# Patient Record
Sex: FEMALE | Race: WHITE | HISPANIC OR LATINO | Employment: PART TIME | URBAN - METROPOLITAN AREA
[De-identification: names, ages, dates, MRNs, and addresses within clinical notes are randomized per-mention and may not be internally consistent; named-entity substitution may affect disease eponyms.]

---

## 2017-12-12 LAB
EXTERNAL ABO GROUPING: NORMAL
EXTERNAL ANTIBODY SCREEN: NEGATIVE
EXTERNAL HEPATITIS B SURFACE ANTIGEN: NEGATIVE
EXTERNAL HEPATITIS C AB: NORMAL
EXTERNAL RH FACTOR: POSITIVE
EXTERNAL RUBELLA QUALITATIVE: NORMAL
EXTERNAL SYPHILIS RPR SCREEN: NORMAL
HIV1 P24 AG SERPL QL IA: NORMAL

## 2018-02-04 ENCOUNTER — HOSPITAL ENCOUNTER (OUTPATIENT)
Facility: HOSPITAL | Age: 33
Setting detail: OBSERVATION
Discharge: HOME | End: 2018-02-05
Attending: OBSTETRICS & GYNECOLOGY | Admitting: OBSTETRICS & GYNECOLOGY

## 2018-02-04 PROBLEM — Z34.90 PREGNANCY: Status: ACTIVE | Noted: 2018-02-04

## 2018-02-04 LAB
ALBUMIN SERPL-MCNC: 3.7 G/DL (ref 3.5–5.2)
ALBUMIN/GLOB SERPL: 1.1 G/DL
ALP SERPL-CCNC: 60 U/L (ref 39–117)
ALT SERPL W P-5'-P-CCNC: 22 U/L (ref 1–33)
ANION GAP SERPL CALCULATED.3IONS-SCNC: 13.9 MMOL/L
AST SERPL-CCNC: 17 U/L (ref 1–32)
BASOPHILS # BLD AUTO: 0.04 10*3/MM3 (ref 0–0.2)
BASOPHILS NFR BLD AUTO: 0.3 % (ref 0–1.5)
BILIRUB SERPL-MCNC: <0.2 MG/DL (ref 0.1–1.2)
BUN BLD-MCNC: 9 MG/DL (ref 6–20)
BUN/CREAT SERPL: 13 (ref 7–25)
CALCIUM SPEC-SCNC: 8.8 MG/DL (ref 8.6–10.5)
CHLORIDE SERPL-SCNC: 102 MMOL/L (ref 98–107)
CO2 SERPL-SCNC: 22.1 MMOL/L (ref 22–29)
CREAT BLD-MCNC: 0.69 MG/DL (ref 0.57–1)
DEPRECATED RDW RBC AUTO: 44.4 FL (ref 37–54)
EOSINOPHIL # BLD AUTO: 0.36 10*3/MM3 (ref 0–0.7)
EOSINOPHIL NFR BLD AUTO: 2.9 % (ref 0.3–6.2)
ERYTHROCYTE [DISTWIDTH] IN BLOOD BY AUTOMATED COUNT: 14.2 % (ref 11.7–13)
GFR SERPL CREATININE-BSD FRML MDRD: 118 ML/MIN/1.73
GFR SERPL CREATININE-BSD FRML MDRD: 97 ML/MIN/1.73
GLOBULIN UR ELPH-MCNC: 3.4 GM/DL
GLUCOSE BLD-MCNC: 99 MG/DL (ref 65–99)
HCT VFR BLD AUTO: 35.6 % (ref 35.6–45.5)
HGB BLD-MCNC: 11.9 G/DL (ref 11.9–15.5)
IMM GRANULOCYTES # BLD: 0.05 10*3/MM3 (ref 0–0.03)
IMM GRANULOCYTES NFR BLD: 0.4 % (ref 0–0.5)
LYMPHOCYTES # BLD AUTO: 2.99 10*3/MM3 (ref 0.9–4.8)
LYMPHOCYTES NFR BLD AUTO: 23.8 % (ref 19.6–45.3)
MCH RBC QN AUTO: 28.7 PG (ref 26.9–32)
MCHC RBC AUTO-ENTMCNC: 33.4 G/DL (ref 32.4–36.3)
MCV RBC AUTO: 85.8 FL (ref 80.5–98.2)
MONOCYTES # BLD AUTO: 0.93 10*3/MM3 (ref 0.2–1.2)
MONOCYTES NFR BLD AUTO: 7.4 % (ref 5–12)
NEUTROPHILS # BLD AUTO: 8.2 10*3/MM3 (ref 1.9–8.1)
NEUTROPHILS NFR BLD AUTO: 65.2 % (ref 42.7–76)
PLATELET # BLD AUTO: 285 10*3/MM3 (ref 140–500)
PMV BLD AUTO: 11.3 FL (ref 6–12)
POTASSIUM BLD-SCNC: 3.7 MMOL/L (ref 3.5–5.2)
PROT SERPL-MCNC: 7.1 G/DL (ref 6–8.5)
RBC # BLD AUTO: 4.15 10*6/MM3 (ref 3.9–5.2)
SODIUM BLD-SCNC: 138 MMOL/L (ref 136–145)
WBC NRBC COR # BLD: 12.57 10*3/MM3 (ref 4.5–10.7)

## 2018-02-04 PROCEDURE — G0378 HOSPITAL OBSERVATION PER HR: HCPCS

## 2018-02-04 PROCEDURE — 85025 COMPLETE CBC W/AUTO DIFF WBC: CPT | Performed by: OBSTETRICS & GYNECOLOGY

## 2018-02-04 PROCEDURE — 80053 COMPREHEN METABOLIC PANEL: CPT | Performed by: OBSTETRICS & GYNECOLOGY

## 2018-02-04 RX ORDER — NIFEDIPINE 20 MG/1
20 CAPSULE ORAL ONCE
Status: COMPLETED | OUTPATIENT
Start: 2018-02-04 | End: 2018-02-04

## 2018-02-04 RX ORDER — ACETAMINOPHEN 500 MG
1000 TABLET ORAL ONCE
Status: COMPLETED | OUTPATIENT
Start: 2018-02-04 | End: 2018-02-04

## 2018-02-04 RX ORDER — ONDANSETRON 4 MG/1
4 TABLET, FILM COATED ORAL EVERY 8 HOURS PRN
Status: ON HOLD | COMMUNITY
End: 2020-02-24

## 2018-02-04 RX ORDER — PRENATAL VIT NO.126/IRON/FOLIC 28MG-0.8MG
1 TABLET ORAL DAILY
COMMUNITY

## 2018-02-04 RX ADMIN — NIFEDIPINE 20 MG: 20 CAPSULE, LIQUID FILLED ORAL at 20:40

## 2018-02-04 RX ADMIN — NIFEDIPINE 20 MG: 20 CAPSULE, LIQUID FILLED ORAL at 20:15

## 2018-02-04 RX ADMIN — ACETAMINOPHEN 1000 MG: 500 TABLET ORAL at 20:15

## 2018-02-04 RX ADMIN — NIFEDIPINE 20 MG: 20 CAPSULE, LIQUID FILLED ORAL at 21:34

## 2018-02-04 RX ADMIN — NIFEDIPINE 20 MG: 20 CAPSULE, LIQUID FILLED ORAL at 22:15

## 2018-02-05 VITALS
OXYGEN SATURATION: 98 % | BODY MASS INDEX: 35.3 KG/M2 | DIASTOLIC BLOOD PRESSURE: 87 MMHG | WEIGHT: 187 LBS | SYSTOLIC BLOOD PRESSURE: 149 MMHG | TEMPERATURE: 98.6 F | RESPIRATION RATE: 18 BRPM | HEIGHT: 61 IN | HEART RATE: 87 BPM

## 2018-02-05 PROBLEM — O16.2 HYPERTENSION AFFECTING PREGNANCY IN SECOND TRIMESTER: Status: ACTIVE | Noted: 2018-02-05

## 2018-02-05 PROCEDURE — G0378 HOSPITAL OBSERVATION PER HR: HCPCS

## 2018-02-05 RX ORDER — NIFEDIPINE 20 MG/1
20 CAPSULE ORAL
Status: DISCONTINUED | OUTPATIENT
Start: 2018-02-05 | End: 2018-02-05 | Stop reason: HOSPADM

## 2018-02-05 RX ORDER — NIFEDIPINE 60 MG/1
60 TABLET, FILM COATED, EXTENDED RELEASE ORAL
Qty: 30 TABLET | Refills: 1 | Status: ON HOLD | OUTPATIENT
Start: 2018-02-05 | End: 2020-02-24

## 2018-02-05 RX ORDER — ZOLPIDEM TARTRATE 5 MG/1
5 TABLET ORAL NIGHTLY PRN
Status: DISCONTINUED | OUTPATIENT
Start: 2018-02-05 | End: 2018-02-05 | Stop reason: HOSPADM

## 2018-02-05 RX ORDER — SENNA AND DOCUSATE SODIUM 50; 8.6 MG/1; MG/1
1 TABLET, FILM COATED ORAL 2 TIMES DAILY PRN
Status: DISCONTINUED | OUTPATIENT
Start: 2018-02-05 | End: 2018-02-05 | Stop reason: HOSPADM

## 2018-02-05 RX ORDER — FAMOTIDINE 20 MG/1
20 TABLET, FILM COATED ORAL 2 TIMES DAILY PRN
Status: DISCONTINUED | OUTPATIENT
Start: 2018-02-05 | End: 2018-02-05 | Stop reason: HOSPADM

## 2018-02-05 RX ORDER — CALCIUM CARBONATE 200(500)MG
2 TABLET,CHEWABLE ORAL DAILY PRN
Status: DISCONTINUED | OUTPATIENT
Start: 2018-02-05 | End: 2018-02-05 | Stop reason: HOSPADM

## 2018-02-05 RX ORDER — NIFEDIPINE 60 MG/1
60 TABLET, EXTENDED RELEASE ORAL
Status: DISCONTINUED | OUTPATIENT
Start: 2018-02-05 | End: 2018-02-05 | Stop reason: HOSPADM

## 2018-02-05 RX ORDER — SODIUM CHLORIDE 0.9 % (FLUSH) 0.9 %
1-10 SYRINGE (ML) INJECTION AS NEEDED
Status: DISCONTINUED | OUTPATIENT
Start: 2018-02-05 | End: 2018-02-05 | Stop reason: HOSPADM

## 2018-02-05 RX ORDER — ACETAMINOPHEN 325 MG/1
975 TABLET ORAL EVERY 4 HOURS PRN
Status: DISCONTINUED | OUTPATIENT
Start: 2018-02-05 | End: 2018-02-05 | Stop reason: HOSPADM

## 2018-02-05 RX ORDER — DIPHENHYDRAMINE HCL 25 MG
25 CAPSULE ORAL EVERY 4 HOURS PRN
Status: DISCONTINUED | OUTPATIENT
Start: 2018-02-05 | End: 2018-02-05 | Stop reason: HOSPADM

## 2018-02-05 RX ADMIN — ACETAMINOPHEN 975 MG: 325 TABLET ORAL at 06:37

## 2018-02-05 RX ADMIN — NIFEDIPINE 60 MG: 60 TABLET, FILM COATED, EXTENDED RELEASE ORAL at 08:50

## 2018-02-05 NOTE — DISCHARGE SUMMARY
Date of Discharge:  2/5/2018    Discharge Diagnosis: 15 weeks hypertension    Presenting Problem/History of Present Illness  Pregnancy [Z34.90]  Hypertension affecting pregnancy in second trimester [O16.2]       Hospital Course  Patient is a 32 y.o. female presented from [facility] after going there for eval with abdominal pain.  FHR was fine.  No bleeding.  But BP was noted to be elevated so pt was transferred here for tx.  Responded well to Procardia.     Procedures Performed  Bedrest, antihypertnesive therapy       Consults:   Consults     No orders found for last 30 day(s).          Pertinent Test Results: none    Condition on Discharge:  Feels well.  Reports good FM.  No vb.  Ready for discharge    Vital Signs  Temp:  [98.3 °F (36.8 °C)] 98.3 °F (36.8 °C)  Heart Rate:  [] 89  Resp:  [16-18] 18  BP: (130-213)/() 140/83    Physical Exam:  General: Awake and alert  Abdomen: Gravid, soft,  non tender  Extremities:  Calves NT bilaterally        Discharge Disposition  Home or Self Careexcellent    Discharge Medications   Koby Walden   [place] Medication Instructions CRISTINA:[id number]    Printed on:02/05/18 8506   Medication Information                      NIFEdipine XL (ADALAT CC) 60 MG 24 hr tablet  Take 1 tablet by mouth Daily.             ondansetron (ZOFRAN) 4 MG tablet  Take 4 mg by mouth Every 8 (Eight) Hours As Needed for Nausea or Vomiting.             Prenatal Vit-Fe Fumarate-FA (PRENATAL, CLASSIC, VITAMIN) 28-0.8 MG tablet tablet  Take 1 tablet by mouth Daily.                 Discharge Diet:   regular  Activity at Discharge:   Ad luana  Follow-up Appointments  2/8    Test Results Pending at Discharge  none     Wendie Palomo MD  02/05/18  8:25 AM

## 2018-02-05 NOTE — H&P
.[facility]  Obstetric History and Physical    Chief Complaint   Patient presents with   • Abdominal Pain     Pt went to [facility] ED for abd pain. Sent here due to elevated BP. Pt reports has had the stomach bug this week with last emesis at 1400. C/O headache also       Subjective     Patient is a 32 y.o. female  currently at 14w6d, who presents with inc bp when seen at [facility] ER. bp 210/110. Office chart says has essential htn but pt said no problems since end of last preg. No bp meds.          Past OB History:       Obstetric History       T1      L1     SAB0   TAB0   Ectopic0   Multiple0   Live Births1       # Outcome Date GA Lbr Alfredo/2nd Weight Sex Delivery Anes PTL Lv   2 Current            1 Term 04    F Vag-Spont  N PEE      Complications: Hypertension affecting pregnancy in third trimester          Past Medical History: Past Medical History:   Diagnosis Date   • Gestational hypertension    • Ovarian cyst     Right side      Past Surgical History History reviewed. No pertinent surgical history.   Family History: History reviewed. No pertinent family history.   Social History:  reports that she quit smoking about 10 years ago. She has never used smokeless tobacco.   reports that she does not drink alcohol.   reports that she does not use illicit drugs.    Allergies:     Review of patient's allergies indicates no known allergies.       Objective       Vital Signs Range for the last 24 hours  Temperature: Temp:  [98.3 °F (36.8 °C)] 98.3 °F (36.8 °C)   Temp Source: Temp src: Oral   BP: BP: (141-213)/() 141/90   Pulse: Heart Rate:  [] 118   Respirations: Resp:  [16] 16                   Physical Examination:                                 Fetal Heart Rate Assessment   Method: Fetal HR Assessment Method: intermittent auscultation, using Doppler   Beats/min: Fetal HR (Beats/Min): 166   Baseline:     Varibility:     Accels:     Decels:     Tracing Category:          Results from last 7 days  Lab Units 02/04/18 2018   WBC 10*3/mm3 12.57*   HEMOGLOBIN g/dL 11.9   HEMATOCRIT % 35.6   PLATELETS 10*3/mm3 285       Results from last 7 days  Lab Units 02/04/18 2018   SODIUM mmol/L 138   POTASSIUM mmol/L 3.7   CHLORIDE mmol/L 102   CO2 mmol/L 22.1   BUN mg/dL 9   CREATININE mg/dL 0.69   CALCIUM mg/dL 8.8   BILIRUBIN mg/dL <0.2   ALK PHOS U/L 60   ALT (SGPT) U/L 22   AST (SGOT) U/L 17   GLUCOSE mg/dL 99                   Assessment/Plan       Assessment:  1.  Intrauterine pregnancy at 14w6d weeks gestation with reassuring fetal status.    2.  Severe HTN - needed 80mg procardia po to bring from 200/110 range to 160/90    Plan:   admit to antepartum overnight to start antihypertensive meds and get to 150/90 range.      Office prenatal reviewed        Jolene Campbell MD  2/4/2018  11:02 PM

## 2018-02-05 NOTE — PLAN OF CARE
Problem: Patient Care Overview (Adult)  Goal: Plan of Care Review  Outcome: Ongoing (interventions implemented as appropriate)   02/05/18 0640   Coping/Psychosocial Response Interventions   Plan Of Care Reviewed With patient   Patient Care Overview   Progress no change   Outcome Evaluation   Outcome Summary/Follow up Plan Labile BPs. Tylenol given for HA. Pt to start Procardia 60 mg XL today.       Problem: Prenatal Inpatient (Adult,Obstetrics,Pediatric)  Goal: Signs and Symptoms of Listed Potential Problems Will be Absent or Manageable (Prenatal Inpatient)  Outcome: Ongoing (interventions implemented as appropriate)   02/05/18 0640   Prenatal Inpatient   Problems Assessed (Prenatal Patient) all   Problems Present (Prenatal Patient) none       Problem: Hypertensive Disorders in Pregnancy (Adult,Obstetrics,Pediatric)  Goal: Signs and Symptoms of Listed Potential Problems Will be Absent or Manageable (Hypertensive Disorders in Pregnancy)  Outcome: Ongoing (interventions implemented as appropriate)   02/05/18 0640   Hypertensive Disorders in Pregnancy   Problems Assessed (Hypertensive Disorders in Pregnancy) all   Problems Present (Hypertensive Disorders in Pregnancy) none

## 2018-02-05 NOTE — NURSING NOTE
Discharge instructions reviewed with pt and sig other.  AVS reviewed in its entirety.  No questions at this time.  Pt declined transport and ambulated off the unit with sig other

## 2018-02-08 ENCOUNTER — TELEPHONE (OUTPATIENT)
Dept: LABOR AND DELIVERY | Facility: HOSPITAL | Age: 33
End: 2018-02-08

## 2018-04-09 ENCOUNTER — HOSPITAL ENCOUNTER (OUTPATIENT)
Facility: HOSPITAL | Age: 33
Setting detail: OBSERVATION
Discharge: ANOTHER HOSPITAL | End: 2018-04-09
Attending: OBSTETRICS & GYNECOLOGY | Admitting: OBSTETRICS & GYNECOLOGY

## 2018-04-09 VITALS
HEIGHT: 61 IN | TEMPERATURE: 98.2 F | HEART RATE: 80 BPM | RESPIRATION RATE: 16 BRPM | WEIGHT: 194 LBS | OXYGEN SATURATION: 100 % | DIASTOLIC BLOOD PRESSURE: 95 MMHG | SYSTOLIC BLOOD PRESSURE: 168 MMHG | BODY MASS INDEX: 36.63 KG/M2

## 2018-04-09 LAB
ALBUMIN SERPL-MCNC: 3.1 G/DL (ref 3.5–5.2)
ALBUMIN/GLOB SERPL: 0.9 G/DL
ALP SERPL-CCNC: 82 U/L (ref 39–117)
ALT SERPL W P-5'-P-CCNC: 37 U/L (ref 1–33)
ANION GAP SERPL CALCULATED.3IONS-SCNC: 13.4 MMOL/L
AST SERPL-CCNC: 30 U/L (ref 1–32)
BASOPHILS # BLD AUTO: 0.03 10*3/MM3 (ref 0–0.2)
BASOPHILS NFR BLD AUTO: 0.2 % (ref 0–1.5)
BILIRUB SERPL-MCNC: 0.3 MG/DL (ref 0.1–1.2)
BUN BLD-MCNC: 11 MG/DL (ref 6–20)
BUN/CREAT SERPL: 15.3 (ref 7–25)
CALCIUM SPEC-SCNC: 8.9 MG/DL (ref 8.6–10.5)
CHLORIDE SERPL-SCNC: 100 MMOL/L (ref 98–107)
CO2 SERPL-SCNC: 22.6 MMOL/L (ref 22–29)
CREAT BLD-MCNC: 0.72 MG/DL (ref 0.57–1)
DEPRECATED RDW RBC AUTO: 47.8 FL (ref 37–54)
EOSINOPHIL # BLD AUTO: 0.11 10*3/MM3 (ref 0–0.7)
EOSINOPHIL NFR BLD AUTO: 0.9 % (ref 0.3–6.2)
ERYTHROCYTE [DISTWIDTH] IN BLOOD BY AUTOMATED COUNT: 14.9 % (ref 11.7–13)
EXPIRATION DATE: ABNORMAL
GFR SERPL CREATININE-BSD FRML MDRD: 92 ML/MIN/1.73
GLOBULIN UR ELPH-MCNC: 3.3 GM/DL
GLUCOSE BLD-MCNC: 85 MG/DL (ref 65–99)
GLUCOSE BLDC GLUCOMTR-MCNC: 86 MG/DL (ref 70–130)
HCT VFR BLD AUTO: 33.7 % (ref 35.6–45.5)
HGB BLD-MCNC: 11.2 G/DL (ref 11.9–15.5)
IMM GRANULOCYTES # BLD: 0.09 10*3/MM3 (ref 0–0.03)
IMM GRANULOCYTES NFR BLD: 0.7 % (ref 0–0.5)
LYMPHOCYTES # BLD AUTO: 2.77 10*3/MM3 (ref 0.9–4.8)
LYMPHOCYTES NFR BLD AUTO: 21.9 % (ref 19.6–45.3)
Lab: ABNORMAL
MCH RBC QN AUTO: 29.2 PG (ref 26.9–32)
MCHC RBC AUTO-ENTMCNC: 33.2 G/DL (ref 32.4–36.3)
MCV RBC AUTO: 87.8 FL (ref 80.5–98.2)
MONOCYTES # BLD AUTO: 0.85 10*3/MM3 (ref 0.2–1.2)
MONOCYTES NFR BLD AUTO: 6.7 % (ref 5–12)
NEUTROPHILS # BLD AUTO: 8.79 10*3/MM3 (ref 1.9–8.1)
NEUTROPHILS NFR BLD AUTO: 69.6 % (ref 42.7–76)
NRBC BLD MANUAL-RTO: 0 /100 WBC (ref 0–0)
PLATELET # BLD AUTO: 148 10*3/MM3 (ref 140–500)
PMV BLD AUTO: 11.1 FL (ref 6–12)
POTASSIUM BLD-SCNC: 4 MMOL/L (ref 3.5–5.2)
PROT SERPL-MCNC: 6.4 G/DL (ref 6–8.5)
PROT UR STRIP-MCNC: ABNORMAL MG/DL
RBC # BLD AUTO: 3.84 10*6/MM3 (ref 3.9–5.2)
SODIUM BLD-SCNC: 136 MMOL/L (ref 136–145)
WBC NRBC COR # BLD: 12.64 10*3/MM3 (ref 4.5–10.7)

## 2018-04-09 PROCEDURE — G0378 HOSPITAL OBSERVATION PER HR: HCPCS

## 2018-04-09 PROCEDURE — 85027 COMPLETE CBC AUTOMATED: CPT | Performed by: OBSTETRICS & GYNECOLOGY

## 2018-04-09 PROCEDURE — 81002 URINALYSIS NONAUTO W/O SCOPE: CPT | Performed by: OBSTETRICS & GYNECOLOGY

## 2018-04-09 PROCEDURE — 96374 THER/PROPH/DIAG INJ IV PUSH: CPT

## 2018-04-09 PROCEDURE — 96361 HYDRATE IV INFUSION ADD-ON: CPT

## 2018-04-09 PROCEDURE — 25010000002 PROMETHAZINE PER 50 MG: Performed by: OBSTETRICS & GYNECOLOGY

## 2018-04-09 PROCEDURE — 25010000002 BETAMETHASONE ACET & SOD PHOS PER 4 MG: Performed by: OBSTETRICS & GYNECOLOGY

## 2018-04-09 PROCEDURE — 25010000002 HYDRALAZINE PER 20 MG: Performed by: OBSTETRICS & GYNECOLOGY

## 2018-04-09 PROCEDURE — 82962 GLUCOSE BLOOD TEST: CPT

## 2018-04-09 PROCEDURE — 80053 COMPREHEN METABOLIC PANEL: CPT | Performed by: OBSTETRICS & GYNECOLOGY

## 2018-04-09 PROCEDURE — 96372 THER/PROPH/DIAG INJ SC/IM: CPT

## 2018-04-09 PROCEDURE — 96376 TX/PRO/DX INJ SAME DRUG ADON: CPT

## 2018-04-09 PROCEDURE — 96375 TX/PRO/DX INJ NEW DRUG ADDON: CPT

## 2018-04-09 RX ORDER — LABETALOL 200 MG/1
300 TABLET, FILM COATED ORAL EVERY 12 HOURS SCHEDULED
Status: DISCONTINUED | OUTPATIENT
Start: 2018-04-09 | End: 2018-04-09 | Stop reason: HOSPADM

## 2018-04-09 RX ORDER — METHYLDOPA 500 MG/1
1000 TABLET, FILM COATED ORAL EVERY 8 HOURS SCHEDULED
Status: DISCONTINUED | OUTPATIENT
Start: 2018-04-09 | End: 2018-04-09 | Stop reason: HOSPADM

## 2018-04-09 RX ORDER — PROMETHAZINE HYDROCHLORIDE 25 MG/ML
12.5 INJECTION, SOLUTION INTRAMUSCULAR; INTRAVENOUS ONCE
Status: COMPLETED | OUTPATIENT
Start: 2018-04-09 | End: 2018-04-09

## 2018-04-09 RX ORDER — MAGNESIUM SULFATE HEPTAHYDRATE 40 MG/ML
4 INJECTION, SOLUTION INTRAVENOUS ONCE
Status: DISCONTINUED | OUTPATIENT
Start: 2018-04-09 | End: 2018-04-09

## 2018-04-09 RX ORDER — MAGNESIUM SULFATE HEPTAHYDRATE 40 MG/ML
2 INJECTION, SOLUTION INTRAVENOUS CONTINUOUS
Status: DISCONTINUED | OUTPATIENT
Start: 2018-04-09 | End: 2018-04-09

## 2018-04-09 RX ORDER — MAGNESIUM SULFATE HEPTAHYDRATE 40 MG/ML
2 INJECTION, SOLUTION INTRAVENOUS CONTINUOUS
Status: DISCONTINUED | OUTPATIENT
Start: 2018-04-09 | End: 2018-04-09 | Stop reason: HOSPADM

## 2018-04-09 RX ORDER — METHYLDOPA 250 MG/1
250 TABLET, FILM COATED ORAL 3 TIMES DAILY
COMMUNITY
End: 2020-02-27 | Stop reason: HOSPADM

## 2018-04-09 RX ORDER — SODIUM CHLORIDE, SODIUM LACTATE, POTASSIUM CHLORIDE, CALCIUM CHLORIDE 600; 310; 30; 20 MG/100ML; MG/100ML; MG/100ML; MG/100ML
125 INJECTION, SOLUTION INTRAVENOUS CONTINUOUS
Status: DISCONTINUED | OUTPATIENT
Start: 2018-04-09 | End: 2018-04-09 | Stop reason: HOSPADM

## 2018-04-09 RX ORDER — HYDRALAZINE HYDROCHLORIDE 20 MG/ML
20 INJECTION INTRAMUSCULAR; INTRAVENOUS EVERY 6 HOURS PRN
Status: DISCONTINUED | OUTPATIENT
Start: 2018-04-09 | End: 2018-04-09 | Stop reason: HOSPADM

## 2018-04-09 RX ORDER — ACETAMINOPHEN 500 MG
500 TABLET ORAL EVERY 6 HOURS PRN
Status: ON HOLD | COMMUNITY
End: 2020-02-24

## 2018-04-09 RX ORDER — LABETALOL HYDROCHLORIDE 5 MG/ML
20 INJECTION, SOLUTION INTRAVENOUS ONCE
Status: COMPLETED | OUTPATIENT
Start: 2018-04-09 | End: 2018-04-09

## 2018-04-09 RX ORDER — BETAMETHASONE SODIUM PHOSPHATE AND BETAMETHASONE ACETATE 3; 3 MG/ML; MG/ML
12 INJECTION, SUSPENSION INTRA-ARTICULAR; INTRALESIONAL; INTRAMUSCULAR; SOFT TISSUE EVERY 24 HOURS
Status: DISCONTINUED | OUTPATIENT
Start: 2018-04-09 | End: 2018-04-09 | Stop reason: HOSPADM

## 2018-04-09 RX ADMIN — SODIUM CHLORIDE, POTASSIUM CHLORIDE, SODIUM LACTATE AND CALCIUM CHLORIDE 125 ML/HR: 600; 310; 30; 20 INJECTION, SOLUTION INTRAVENOUS at 11:47

## 2018-04-09 RX ADMIN — MEPERIDINE HYDROCHLORIDE 25 MG: 25 INJECTION, SOLUTION INTRAMUSCULAR; INTRAVENOUS; SUBCUTANEOUS at 15:58

## 2018-04-09 RX ADMIN — BETAMETHASONE SODIUM PHOSPHATE AND BETAMETHASONE ACETATE 12 MG: 3; 3 INJECTION, SUSPENSION INTRA-ARTICULAR; INTRALESIONAL; INTRAMUSCULAR at 15:31

## 2018-04-09 RX ADMIN — LABETALOL HYDROCHLORIDE 20 MG: 5 INJECTION INTRAVENOUS at 12:36

## 2018-04-09 RX ADMIN — HYDRALAZINE HYDROCHLORIDE 20 MG: 20 INJECTION INTRAMUSCULAR; INTRAVENOUS at 14:43

## 2018-04-09 RX ADMIN — PROMETHAZINE HYDROCHLORIDE 12.5 MG: 25 INJECTION INTRAMUSCULAR; INTRAVENOUS at 16:01

## 2018-04-09 RX ADMIN — METHYLDOPA 1000 MG: 500 TABLET ORAL at 15:43

## 2018-04-09 RX ADMIN — LABETALOL HCL 300 MG: 200 TABLET, FILM COATED ORAL at 15:36

## 2018-04-09 RX ADMIN — MAGNESIUM SULFATE HEPTAHYDRATE 2 G/HR: 40 INJECTION, SOLUTION INTRAVENOUS at 15:31

## 2018-04-09 NOTE — PLAN OF CARE
Problem: Patient Care Overview  Goal: Plan of Care Review  Outcome: Unable to achieve outcome(s) by discharge Date Met: 18   Coping/Psychosocial   Plan of Care Reviewed With patient;family;significant other   Plan of Care Review   Progress no change   OTHER   Outcome Summary Patient transferred to [facility] for further care     Goal: Individualization and Mutuality  Outcome: Unable to achieve outcome(s) by discharge Date Met: 18   Individualization   Patient Specific Preferences lower blood pressures   Patient Specific Goals (Include Timeframe) maintain pregnacy to term   Patient Specific Interventions blood pressure medication, position changes and oxygen   Mutuality/Individual Preferences   What Anxieties, Fears, Concerns, or Questions Do You Have About Your Care?  delivery   How Would You and/or Your Support Person Like to Participate in Your Care? FOB called to bedside by patient before transport     Goal: Interprofessional Rounds/Family Conf  Outcome: Unable to achieve outcome(s) by discharge Date Met: 18   Interdisciplinary Rounds/Family Conf   Participants family;nursing;patient;physician       Problem: Hypertensive Disorders in Pregnancy (Adult,Obstetrics,Pediatric)  Goal: Signs and Symptoms of Listed Potential Problems Will be Absent, Minimized or Managed (Hypertensive Disorders in Pregnancy)  Outcome: Unable to achieve outcome(s) by discharge Date Met: 18   Goal/Outcome Evaluation   Problems Assessed (Hypertensive Disorders in Pregnancy) all   Problems Present (Hypertensive/in PG) (CHTN)

## 2018-04-09 NOTE — H&P
[facility]  Obstetric History and Physical    Patient Name: Koby Walden  :  1985  MRN:  [id number]        Chief Complaint   Patient presents with   • pregnancy     c/o elevated BP all weekend w/frontal h/a pain that has prevented sleep. States hx chronic HTN but never this high. Frontal stabbing headache.       Subjective     Patient is a 33 y.o. female  currently at 24w0d, who presents from the office complaining of elevated bp since Saturday.  On Saturday she noticed a severe headache and has had 2 episodes over the weekend of n/v. She does take her BP at home and noticed it was 160-190/100.  She has had elevated BPs for 2months but no history of CHTN predating pregnancy. She was being managed with 30mg xl Procardia and Aldomet 500 in the am and 250 in the pm. She denies edema. She states her headache is much better now.  It is mild since she took Tylenol. She has a 15yo who delivered at term and was uncomplicated. She is 33 but otherwise healthy.      Past Medical History: Past Medical History:   Diagnosis Date   • Gestational hypertension    • Ovarian cyst     Right side      Past Surgical History History reviewed. No pertinent surgical history.   Family History: History reviewed. No pertinent family history.   Social History:  reports that she quit smoking about 10 years ago. She has never used smokeless tobacco.   reports that she does not drink alcohol.   reports that she does not use drugs.    Allergies:     Review of patient's allergies indicates no known allergies.     Past OB History:       Obstetric History       T1      L1     SAB0   TAB0   Ectopic0   Molar0   Multiple0   Live Births1       # Outcome Date GA Lbr Alfredo/2nd Weight Sex Delivery Anes PTL Lv   2 Current            1 Term 04    F Vag-Spont  N PEE      Complications: Hypertension affecting pregnancy in third trimester            Objective       Vital Signs Range for the last 24  hours  Temperature: Temp:  [98 °F (36.7 °C)] 98 °F (36.7 °C)   Temp Source: Temp src: Oral   BP: BP: (198-238)/() 210/96   Pulse: Heart Rate:  [60-83] 69   Respirations: Resp:  [17] 17             Physical Exam:        General :    Alert and cooperative in NAD   Lungs:   Clear to auscultation bilaterally   CV:   Regular rate and rhythm   Abdomen:  Gravid, non-tender, no masses   Vaginal exam:      LE:   no edema and 1pl DTRs     FHR:   150s,    [place]:   No ctx              A/P:    23 and 6wk  with CHTN and my concern for SI pre-clampsia given 3plus protein in the office today.  On admission her Bp's are 220/110s which did not respond to 20mg IV of labetolol. She did respond to 20mg of hydralazine and now her BP is 164/90.  I am starting her on labetolol 300mg bid and aldomet 1000mg tid.  We have a UA in process and are starting a 24hr urine.  CBC/CMP p.  I talked with dr stokes who recommended transfer to  as he is concerned she will not make it to 28wks.  He recommends doing an ultrasound there.  I am starting MgSO4 and BMZ.       Problem List Items Addressed This Visit     None      Visit Diagnoses    None.                 Nelida Magdaleno MD  2018  3:15 PM

## 2020-01-30 LAB
EXTERNAL ANTIBODY SCREEN: NEGATIVE
EXTERNAL HEPATITIS B SURFACE ANTIGEN: NEGATIVE
EXTERNAL HEPATITIS B SURFACE ANTIGEN: NEGATIVE
EXTERNAL HEPATITIS C AB: NEGATIVE
EXTERNAL HEPATITIS C AB: NORMAL
EXTERNAL RUBELLA QUALITATIVE: NORMAL
EXTERNAL RUBELLA QUALITATIVE: NORMAL
EXTERNAL SYPHILIS RPR SCREEN: NORMAL
EXTERNAL SYPHILIS RPR SCREEN: NORMAL
HIV1 P24 AG SERPL QL IA: NEGATIVE
HIV1 P24 AG SERPL QL IA: NORMAL

## 2020-02-06 ENCOUNTER — TRANSCRIBE ORDERS (OUTPATIENT)
Dept: OBSTETRICS AND GYNECOLOGY | Facility: CLINIC | Age: 35
End: 2020-02-06

## 2020-02-06 DIAGNOSIS — O16.2 HYPERTENSION AFFECTING PREGNANCY IN SECOND TRIMESTER: Primary | ICD-10-CM

## 2020-02-06 DIAGNOSIS — Z87.59 HISTORY OF IUFD: ICD-10-CM

## 2020-02-24 ENCOUNTER — HOSPITAL ENCOUNTER (INPATIENT)
Facility: HOSPITAL | Age: 35
LOS: 3 days | Discharge: HOME | End: 2020-02-27
Attending: OBSTETRICS & GYNECOLOGY | Admitting: OBSTETRICS & GYNECOLOGY

## 2020-02-24 ENCOUNTER — HOSPITAL ENCOUNTER (OUTPATIENT)
Dept: ULTRASOUND IMAGING | Facility: HOSPITAL | Age: 35
Discharge: HOME | End: 2020-02-24
Admitting: OBSTETRICS & GYNECOLOGY

## 2020-02-24 ENCOUNTER — OFFICE VISIT (OUTPATIENT)
Dept: OBSTETRICS AND GYNECOLOGY | Facility: CLINIC | Age: 35
End: 2020-02-24

## 2020-02-24 ENCOUNTER — TRANSCRIBE ORDERS (OUTPATIENT)
Dept: ULTRASOUND IMAGING | Facility: HOSPITAL | Age: 35
End: 2020-02-24

## 2020-02-24 VITALS
BODY MASS INDEX: 36.63 KG/M2 | WEIGHT: 194 LBS | HEIGHT: 61 IN | DIASTOLIC BLOOD PRESSURE: 100 MMHG | HEART RATE: 102 BPM | SYSTOLIC BLOOD PRESSURE: 188 MMHG

## 2020-02-24 DIAGNOSIS — O16.2 HYPERTENSION AFFECTING PREGNANCY IN SECOND TRIMESTER: Primary | ICD-10-CM

## 2020-02-24 DIAGNOSIS — O16.2 HYPERTENSION AFFECTING PREGNANCY IN SECOND TRIMESTER: ICD-10-CM

## 2020-02-24 DIAGNOSIS — O09.522 AMA (ADVANCED MATERNAL AGE) MULTIGRAVIDA 35+, SECOND TRIMESTER: ICD-10-CM

## 2020-02-24 DIAGNOSIS — Z87.59 HISTORY OF IUFD: ICD-10-CM

## 2020-02-24 PROBLEM — O16.9 HYPERTENSION AFFECTING PREGNANCY: Status: ACTIVE | Noted: 2020-02-24

## 2020-02-24 PROBLEM — O16.1: Status: ACTIVE | Noted: 2020-02-24

## 2020-02-24 LAB
ABO GROUP BLD: NORMAL
ALBUMIN SERPL-MCNC: 3.7 G/DL (ref 3.5–5.2)
ALBUMIN/GLOB SERPL: 1.2 G/DL
ALP SERPL-CCNC: 53 U/L (ref 39–117)
ALT SERPL W P-5'-P-CCNC: 32 U/L (ref 1–33)
ANION GAP SERPL CALCULATED.3IONS-SCNC: 13.2 MMOL/L (ref 5–15)
AST SERPL-CCNC: 31 U/L (ref 1–32)
BASOPHILS # BLD AUTO: 0.09 10*3/MM3 (ref 0–0.2)
BASOPHILS NFR BLD AUTO: 0.7 % (ref 0–1.5)
BILIRUB SERPL-MCNC: 0.2 MG/DL (ref 0.2–1.2)
BLD GP AB SCN SERPL QL: NEGATIVE
BUN BLD-MCNC: 7 MG/DL (ref 6–20)
BUN/CREAT SERPL: 10.4 (ref 7–25)
CALCIUM SPEC-SCNC: 8.7 MG/DL (ref 8.6–10.5)
CHLORIDE SERPL-SCNC: 103 MMOL/L (ref 98–107)
CO2 SERPL-SCNC: 19.8 MMOL/L (ref 22–29)
CREAT BLD-MCNC: 0.67 MG/DL (ref 0.57–1)
DEPRECATED RDW RBC AUTO: 44.7 FL (ref 37–54)
EOSINOPHIL # BLD AUTO: 0.4 10*3/MM3 (ref 0–0.4)
EOSINOPHIL NFR BLD AUTO: 3 % (ref 0.3–6.2)
ERYTHROCYTE [DISTWIDTH] IN BLOOD BY AUTOMATED COUNT: 14.5 % (ref 12.3–15.4)
GFR SERPL CREATININE-BSD FRML MDRD: 100 ML/MIN/1.73
GLOBULIN UR ELPH-MCNC: 3.2 GM/DL
GLUCOSE BLD-MCNC: 88 MG/DL (ref 65–99)
HCT VFR BLD AUTO: 33.8 % (ref 34–46.6)
HGB BLD-MCNC: 11.2 G/DL (ref 12–15.9)
IMM GRANULOCYTES # BLD AUTO: 0.08 10*3/MM3 (ref 0–0.05)
IMM GRANULOCYTES NFR BLD AUTO: 0.6 % (ref 0–0.5)
LYMPHOCYTES # BLD AUTO: 3.2 10*3/MM3 (ref 0.7–3.1)
LYMPHOCYTES NFR BLD AUTO: 23.7 % (ref 19.6–45.3)
MCH RBC QN AUTO: 28 PG (ref 26.6–33)
MCHC RBC AUTO-ENTMCNC: 33.1 G/DL (ref 31.5–35.7)
MCV RBC AUTO: 84.5 FL (ref 79–97)
MONOCYTES # BLD AUTO: 1.08 10*3/MM3 (ref 0.1–0.9)
MONOCYTES NFR BLD AUTO: 8 % (ref 5–12)
NEUTROPHILS # BLD AUTO: 8.66 10*3/MM3 (ref 1.7–7)
NEUTROPHILS NFR BLD AUTO: 64 % (ref 42.7–76)
NRBC BLD AUTO-RTO: 0 /100 WBC (ref 0–0.2)
PLATELET # BLD AUTO: 314 10*3/MM3 (ref 140–450)
PMV BLD AUTO: 11.1 FL (ref 6–12)
POTASSIUM BLD-SCNC: 3.5 MMOL/L (ref 3.5–5.2)
PROT SERPL-MCNC: 6.9 G/DL (ref 6–8.5)
RBC # BLD AUTO: 4 10*6/MM3 (ref 3.77–5.28)
RH BLD: POSITIVE
SODIUM BLD-SCNC: 136 MMOL/L (ref 136–145)
T&S EXPIRATION DATE: NORMAL
WBC NRBC COR # BLD: 13.51 10*3/MM3 (ref 3.4–10.8)

## 2020-02-24 PROCEDURE — 76801 OB US < 14 WKS SINGLE FETUS: CPT | Performed by: OBSTETRICS & GYNECOLOGY

## 2020-02-24 PROCEDURE — 99243 OFF/OP CNSLTJ NEW/EST LOW 30: CPT | Performed by: OBSTETRICS & GYNECOLOGY

## 2020-02-24 PROCEDURE — 80053 COMPREHEN METABOLIC PANEL: CPT | Performed by: OBSTETRICS & GYNECOLOGY

## 2020-02-24 PROCEDURE — 86901 BLOOD TYPING SEROLOGIC RH(D): CPT | Performed by: OBSTETRICS & GYNECOLOGY

## 2020-02-24 PROCEDURE — 85025 COMPLETE CBC W/AUTO DIFF WBC: CPT | Performed by: OBSTETRICS & GYNECOLOGY

## 2020-02-24 PROCEDURE — 86900 BLOOD TYPING SEROLOGIC ABO: CPT | Performed by: OBSTETRICS & GYNECOLOGY

## 2020-02-24 PROCEDURE — 76801 OB US < 14 WKS SINGLE FETUS: CPT

## 2020-02-24 PROCEDURE — 86850 RBC ANTIBODY SCREEN: CPT | Performed by: OBSTETRICS & GYNECOLOGY

## 2020-02-24 RX ORDER — SODIUM CHLORIDE 0.9 % (FLUSH) 0.9 %
10 SYRINGE (ML) INJECTION EVERY 8 HOURS
Status: DISCONTINUED | OUTPATIENT
Start: 2020-02-24 | End: 2020-02-26

## 2020-02-24 RX ORDER — NIFEDIPINE 30 MG/1
30 TABLET, EXTENDED RELEASE ORAL 2 TIMES DAILY
Status: DISCONTINUED | OUTPATIENT
Start: 2020-02-24 | End: 2020-02-26

## 2020-02-24 RX ORDER — ACETAMINOPHEN 325 MG/1
650 TABLET ORAL EVERY 4 HOURS PRN
Status: DISCONTINUED | OUTPATIENT
Start: 2020-02-24 | End: 2020-02-27 | Stop reason: HOSPADM

## 2020-02-24 RX ORDER — NIFEDIPINE 10 MG/1
20 CAPSULE ORAL ONCE
Status: COMPLETED | OUTPATIENT
Start: 2020-02-24 | End: 2020-02-24

## 2020-02-24 RX ORDER — ASPIRIN 81 MG/1
81 TABLET, CHEWABLE ORAL DAILY
COMMUNITY
End: 2020-08-30 | Stop reason: HOSPADM

## 2020-02-24 RX ORDER — SODIUM CHLORIDE 0.9 % (FLUSH) 0.9 %
10 SYRINGE (ML) INJECTION AS NEEDED
Status: DISCONTINUED | OUTPATIENT
Start: 2020-02-24 | End: 2020-02-26

## 2020-02-24 RX ADMIN — ACETAMINOPHEN 650 MG: 325 TABLET, FILM COATED ORAL at 21:41

## 2020-02-24 RX ADMIN — SODIUM CHLORIDE, PRESERVATIVE FREE 10 ML: 5 INJECTION INTRAVENOUS at 19:47

## 2020-02-24 RX ADMIN — NIFEDIPINE 20 MG: 10 CAPSULE ORAL at 19:43

## 2020-02-24 NOTE — PROGRESS NOTES
Patient seen in Maternal Fetal Medicine clinic today. Please see full note in [facility].   Arlyn De La Torre MD

## 2020-02-25 LAB — PROT 24H UR-MRATE: 128 MG/24HOURS (ref 0–150)

## 2020-02-25 PROCEDURE — 85613 RUSSELL VIPER VENOM DILUTED: CPT | Performed by: OBSTETRICS & GYNECOLOGY

## 2020-02-25 PROCEDURE — 85670 THROMBIN TIME PLASMA: CPT | Performed by: OBSTETRICS & GYNECOLOGY

## 2020-02-25 PROCEDURE — 81050 URINALYSIS VOLUME MEASURE: CPT | Performed by: OBSTETRICS & GYNECOLOGY

## 2020-02-25 PROCEDURE — 86147 CARDIOLIPIN ANTIBODY EA IG: CPT | Performed by: OBSTETRICS & GYNECOLOGY

## 2020-02-25 PROCEDURE — 85705 THROMBOPLASTIN INHIBITION: CPT | Performed by: OBSTETRICS & GYNECOLOGY

## 2020-02-25 PROCEDURE — 84156 ASSAY OF PROTEIN URINE: CPT | Performed by: OBSTETRICS & GYNECOLOGY

## 2020-02-25 PROCEDURE — 93010 ELECTROCARDIOGRAM REPORT: CPT | Performed by: INTERNAL MEDICINE

## 2020-02-25 PROCEDURE — 86146 BETA-2 GLYCOPROTEIN ANTIBODY: CPT | Performed by: OBSTETRICS & GYNECOLOGY

## 2020-02-25 PROCEDURE — 93005 ELECTROCARDIOGRAM TRACING: CPT | Performed by: OBSTETRICS & GYNECOLOGY

## 2020-02-25 PROCEDURE — 85732 THROMBOPLASTIN TIME PARTIAL: CPT | Performed by: OBSTETRICS & GYNECOLOGY

## 2020-02-25 PROCEDURE — 99233 SBSQ HOSP IP/OBS HIGH 50: CPT | Performed by: OBSTETRICS & GYNECOLOGY

## 2020-02-25 RX ORDER — NIFEDIPINE 30 MG/1
30 TABLET, EXTENDED RELEASE ORAL ONCE
Status: COMPLETED | OUTPATIENT
Start: 2020-02-25 | End: 2020-02-25

## 2020-02-25 RX ADMIN — NIFEDIPINE 30 MG: 30 TABLET, FILM COATED, EXTENDED RELEASE ORAL at 13:03

## 2020-02-25 RX ADMIN — SODIUM CHLORIDE, PRESERVATIVE FREE 10 ML: 5 INJECTION INTRAVENOUS at 08:31

## 2020-02-25 RX ADMIN — SODIUM CHLORIDE, PRESERVATIVE FREE 10 ML: 5 INJECTION INTRAVENOUS at 19:35

## 2020-02-25 RX ADMIN — NIFEDIPINE 30 MG: 30 TABLET, FILM COATED, EXTENDED RELEASE ORAL at 20:52

## 2020-02-25 RX ADMIN — ACETAMINOPHEN 650 MG: 325 TABLET, FILM COATED ORAL at 14:03

## 2020-02-25 RX ADMIN — ACETAMINOPHEN 650 MG: 325 TABLET, FILM COATED ORAL at 19:55

## 2020-02-25 RX ADMIN — NIFEDIPINE 30 MG: 30 TABLET, FILM COATED, EXTENDED RELEASE ORAL at 09:52

## 2020-02-26 LAB
CARDIOLIPIN IGG SER IA-ACNC: <9 GPL U/ML (ref 0–14)
CARDIOLIPIN IGM SER IA-ACNC: <9 MPL U/ML (ref 0–12)

## 2020-02-26 RX ORDER — PRENATAL VIT NO.126/IRON/FOLIC 28MG-0.8MG
1 TABLET ORAL DAILY
Status: DISCONTINUED | OUTPATIENT
Start: 2020-02-26 | End: 2020-02-27 | Stop reason: HOSPADM

## 2020-02-26 RX ORDER — ASPIRIN 81 MG/1
81 TABLET, CHEWABLE ORAL DAILY
Status: DISCONTINUED | OUTPATIENT
Start: 2020-02-26 | End: 2020-02-27 | Stop reason: HOSPADM

## 2020-02-26 RX ADMIN — SODIUM CHLORIDE, PRESERVATIVE FREE 10 ML: 5 INJECTION INTRAVENOUS at 08:24

## 2020-02-26 RX ADMIN — Medication 1 TABLET: at 11:56

## 2020-02-26 RX ADMIN — ASPIRIN 81 MG: 81 TABLET, CHEWABLE ORAL at 11:56

## 2020-02-26 RX ADMIN — NIFEDIPINE 90 MG: 60 TABLET, FILM COATED, EXTENDED RELEASE ORAL at 08:23

## 2020-02-26 RX ADMIN — SODIUM CHLORIDE, PRESERVATIVE FREE 10 ML: 5 INJECTION INTRAVENOUS at 16:19

## 2020-02-27 ENCOUNTER — APPOINTMENT (OUTPATIENT)
Dept: CARDIOLOGY | Facility: HOSPITAL | Age: 35
End: 2020-02-27

## 2020-02-27 VITALS
HEIGHT: 61 IN | OXYGEN SATURATION: 100 % | DIASTOLIC BLOOD PRESSURE: 91 MMHG | WEIGHT: 191 LBS | BODY MASS INDEX: 36.06 KG/M2 | RESPIRATION RATE: 18 BRPM | HEART RATE: 95 BPM | TEMPERATURE: 98.1 F | SYSTOLIC BLOOD PRESSURE: 148 MMHG

## 2020-02-27 LAB
AORTIC DIMENSIONLESS INDEX: 0.7 (DI)
B2 GLYCOPROT1 IGG SER-ACNC: 59 GPI IGG UNITS (ref 0–20)
B2 GLYCOPROT1 IGM SER-ACNC: <9 GPI IGM UNITS (ref 0–32)
BH CV ECHO MEAS - ACS: 1.8 CM
BH CV ECHO MEAS - AO MAX PG: 11 MMHG
BH CV ECHO MEAS - AO MEAN PG (FULL): 1 MMHG
BH CV ECHO MEAS - AO MEAN PG: 5 MMHG
BH CV ECHO MEAS - AO ROOT AREA (BSA CORRECTED): 1.4
BH CV ECHO MEAS - AO ROOT AREA: 5.3 CM^2
BH CV ECHO MEAS - AO ROOT DIAM: 2.6 CM
BH CV ECHO MEAS - AO V2 MAX: 166 CM/SEC
BH CV ECHO MEAS - AO V2 MEAN: 106 CM/SEC
BH CV ECHO MEAS - AO V2 VTI: 32.1 CM
BH CV ECHO MEAS - ASC AORTA: 3.1 CM
BH CV ECHO MEAS - AVA(I,A): 2.1 CM^2
BH CV ECHO MEAS - AVA(I,D): 2.1 CM^2
BH CV ECHO MEAS - BSA(HAYCOCK): 2 M^2
BH CV ECHO MEAS - BSA: 1.9 M^2
BH CV ECHO MEAS - BZI_BMI: 36.1 KILOGRAMS/M^2
BH CV ECHO MEAS - BZI_METRIC_HEIGHT: 154.9 CM
BH CV ECHO MEAS - BZI_METRIC_WEIGHT: 86.6 KG
BH CV ECHO MEAS - DIST REN A EDV LEFT: 58.2 CM/SEC
BH CV ECHO MEAS - DIST REN A PSV LEFT: 155 CM/SEC
BH CV ECHO MEAS - DIST REN A RI LEFT: 0.62
BH CV ECHO MEAS - EDV(CUBED): 79.5 ML
BH CV ECHO MEAS - EDV(MOD-SP2): 74 ML
BH CV ECHO MEAS - EDV(MOD-SP4): 96 ML
BH CV ECHO MEAS - EDV(TEICH): 83.1 ML
BH CV ECHO MEAS - EF(CUBED): 76.6 %
BH CV ECHO MEAS - EF(MOD-BP): 70 %
BH CV ECHO MEAS - EF(MOD-SP2): 67.6 %
BH CV ECHO MEAS - EF(MOD-SP4): 72.9 %
BH CV ECHO MEAS - EF(TEICH): 68.9 %
BH CV ECHO MEAS - ESV(CUBED): 18.6 ML
BH CV ECHO MEAS - ESV(MOD-SP2): 24 ML
BH CV ECHO MEAS - ESV(MOD-SP4): 26 ML
BH CV ECHO MEAS - ESV(TEICH): 25.8 ML
BH CV ECHO MEAS - FS: 38.4 %
BH CV ECHO MEAS - IVS/LVPW: 1
BH CV ECHO MEAS - IVSD: 0.9 CM
BH CV ECHO MEAS - LAT PEAK E' VEL: 10 CM/SEC
BH CV ECHO MEAS - LV DIASTOLIC VOL/BSA (35-75): 51.8 ML/M^2
BH CV ECHO MEAS - LV MASS(C)D: 123.3 GRAMS
BH CV ECHO MEAS - LV MASS(C)DI: 66.6 GRAMS/M^2
BH CV ECHO MEAS - LV MAX PG: 6 MMHG
BH CV ECHO MEAS - LV MEAN PG: 4 MMHG
BH CV ECHO MEAS - LV SYSTOLIC VOL/BSA (12-30): 14 ML/M^2
BH CV ECHO MEAS - LV V1 MAX: 123 CM/SEC
BH CV ECHO MEAS - LV V1 MEAN: 89.2 CM/SEC
BH CV ECHO MEAS - LV V1 VTI: 23.8 CM
BH CV ECHO MEAS - LVIDD: 4.3 CM
BH CV ECHO MEAS - LVIDS: 2.7 CM
BH CV ECHO MEAS - LVLD AP2: 7.7 CM
BH CV ECHO MEAS - LVLD AP4: 8.3 CM
BH CV ECHO MEAS - LVLS AP2: 6.5 CM
BH CV ECHO MEAS - LVLS AP4: 6.7 CM
BH CV ECHO MEAS - LVOT AREA (M): 2.8 CM^2
BH CV ECHO MEAS - LVOT AREA: 2.8 CM^2
BH CV ECHO MEAS - LVOT DIAM: 1.9 CM
BH CV ECHO MEAS - LVPWD: 0.9 CM
BH CV ECHO MEAS - MED PEAK E' VEL: 8 CM/SEC
BH CV ECHO MEAS - MID REN A EDV LEFT: 46.5 CM/SEC
BH CV ECHO MEAS - MID REN A PSV LEFT: 127 CM/SEC
BH CV ECHO MEAS - MID REN A RI LEFT: 0.63
BH CV ECHO MEAS - MV A DUR: 0.15 SEC
BH CV ECHO MEAS - MV A MAX VEL: 73.1 CM/SEC
BH CV ECHO MEAS - MV DEC SLOPE: 439 CM/SEC^2
BH CV ECHO MEAS - MV DEC TIME: 256 SEC
BH CV ECHO MEAS - MV E MAX VEL: 76 CM/SEC
BH CV ECHO MEAS - MV E/A: 1
BH CV ECHO MEAS - MV MEAN PG: 2 MMHG
BH CV ECHO MEAS - MV P1/2T MAX VEL: 98.5 CM/SEC
BH CV ECHO MEAS - MV P1/2T: 65.7 MSEC
BH CV ECHO MEAS - MV V2 MEAN: 66.5 CM/SEC
BH CV ECHO MEAS - MV V2 VTI: 21.4 CM
BH CV ECHO MEAS - MVA P1/2T LCG: 2.2 CM^2
BH CV ECHO MEAS - MVA(P1/2T): 3.3 CM^2
BH CV ECHO MEAS - MVA(VTI): 3.2 CM^2
BH CV ECHO MEAS - PA ACC SLOPE: 1043 CM/SEC^2
BH CV ECHO MEAS - PA ACC TIME: 0.12 SEC
BH CV ECHO MEAS - PA MAX PG: 6.7 MMHG
BH CV ECHO MEAS - PA PR(ACCEL): 23.2 MMHG
BH CV ECHO MEAS - PA V2 MAX: 129 CM/SEC
BH CV ECHO MEAS - PROX REN A EDV LEFT: 54.5 CM/SEC
BH CV ECHO MEAS - PROX REN A PSV LEFT: 159 CM/SEC
BH CV ECHO MEAS - PROX REN A RI LEFT: 0.66
BH CV ECHO MEAS - PULM A REVS DUR: 0.11 SEC
BH CV ECHO MEAS - PULM A REVS VEL: 21.7 CM/SEC
BH CV ECHO MEAS - PULM DIAS VEL: 40.3 CM/SEC
BH CV ECHO MEAS - PULM S/D: 1.2
BH CV ECHO MEAS - PULM SYS VEL: 50.2 CM/SEC
BH CV ECHO MEAS - QP/QS: 0.96
BH CV ECHO MEAS - RAP SYSTOLE: 3 MMHG
BH CV ECHO MEAS - RV MEAN PG: 1 MMHG
BH CV ECHO MEAS - RV V1 MEAN: 56.1 CM/SEC
BH CV ECHO MEAS - RV V1 VTI: 18.7 CM
BH CV ECHO MEAS - RVOT AREA: 3.5 CM^2
BH CV ECHO MEAS - RVOT DIAM: 2.1 CM
BH CV ECHO MEAS - RVSP: 25.1 MMHG
BH CV ECHO MEAS - SI(AO): 92 ML/M^2
BH CV ECHO MEAS - SI(CUBED): 32.9 ML/M^2
BH CV ECHO MEAS - SI(LVOT): 36.4 ML/M^2
BH CV ECHO MEAS - SI(MOD-SP2): 27 ML/M^2
BH CV ECHO MEAS - SI(MOD-SP4): 37.8 ML/M^2
BH CV ECHO MEAS - SI(TEICH): 30.9 ML/M^2
BH CV ECHO MEAS - SV(AO): 170.4 ML
BH CV ECHO MEAS - SV(CUBED): 60.9 ML
BH CV ECHO MEAS - SV(LVOT): 67.5 ML
BH CV ECHO MEAS - SV(MOD-SP2): 50 ML
BH CV ECHO MEAS - SV(MOD-SP4): 70 ML
BH CV ECHO MEAS - SV(RVOT): 64.8 ML
BH CV ECHO MEAS - SV(TEICH): 57.3 ML
BH CV ECHO MEAS - TAPSE (>1.6): 2.3 CM2
BH CV ECHO MEAS - TR MAX VEL: 235 CM/SEC
BH CV ECHO MEASUREMENTS AVERAGE E/E' RATIO: 8.44
BH CV VAS BP LEFT ARM: NORMAL MMHG
BH CV VAS BP LEFT ARM: NORMAL MMHG
BH CV VAS BP RIGHT ARM: NORMAL MMHG
BH CV VAS RENAL AORTIC MID EDV: 19.9 CM/S
BH CV VAS RENAL AORTIC MID PSV: 83.9 CM/S
BH CV VAS RENAL HILUM LEFT EDV: 16.6 CM/S
BH CV VAS RENAL HILUM LEFT PSV: 42.3 CM/S
BH CV VAS RENAL HILUM RIGHT EDV: 24.1 CM/S
BH CV VAS RENAL HILUM RIGHT PSV: 66.4 CM/S
BH CV XLRA - RV BASE: 2.2 CM
BH CV XLRA - TDI S': 15 CM/SEC
BH CV XLRA MEAS - KID L LEFT: 11.4 CM
BH CV XLRA MEAS - RENAL A ORG RI LEFT: 0.66
BH CV XLRA MEAS DIST REN A EDV RIGHT: 42.3 CM/SEC
BH CV XLRA MEAS DIST REN A PSV RIGHT: 127 CM/SEC
BH CV XLRA MEAS DIST REN A RI RIGHT: 0.67
BH CV XLRA MEAS KID L RIGHT: 11.6 CM
BH CV XLRA MEAS KID W RIGHT: 4.5 CM
BH CV XLRA MEAS MID REN A EDV RIGHT: 55.7 CM/SEC
BH CV XLRA MEAS MID REN A PSV RIGHT: 134 CM/SEC
BH CV XLRA MEAS MID REN A RI RIGHT: 0.58
BH CV XLRA MEAS PROX REN A EDV RIGHT: 45.5 CM/SEC
BH CV XLRA MEAS PROX REN A PSV RIGHT: 118 CM/SEC
BH CV XLRA MEAS PROX REN A RI RIGHT: 0.61
BH CV XLRA MEAS RAR LEFT: 1.9
BH CV XLRA MEAS RAR RIGHT: 1.6
BH CV XLRA MEAS RENAL A ORG EDV LEFT: 52.3 CM/SEC
BH CV XLRA MEAS RENAL A ORG EDV RIGHT: 51.7 CM/SEC
BH CV XLRA MEAS RENAL A ORG PSV LEFT: 153 CM/SEC
BH CV XLRA MEAS RENAL A ORG PSV RIGHT: 127 CM/SEC
BH CV XLRA MEAS RENAL A ORG RI RIGHT: 0.59
LA NT DPL PPP: 33.7 SEC (ref 0–55)
LA NT DPL/LA NT HPL PPP-RTO: 0.94 RATIO (ref 0–1.4)
LA NT PLATELET PPP: 31.8 SEC (ref 0–51.9)
LEFT ATRIUM VOLUME INDEX: 31 ML/M2
LEFT KIDNEY WIDTH: 5.5 CM
LEFT RENAL UPPER PARENCHYMA MAX: 21.6 CM/S
LEFT RENAL UPPER PARENCHYMA MIN: 8.92 CM/S
LEFT RENAL UPPER PARENCHYMA RI: 0.59
LUPUS ANTICOAGULANT REFLEX: NORMAL
MAXIMAL PREDICTED HEART RATE: 184 BPM
RIGHT RENAL UPPER PARENCHYMA MAX: 26.1 CM/S
RIGHT RENAL UPPER PARENCHYMA MIN: 8.71 CM/S
RIGHT RENAL UPPER PARENCHYMA RI: 0.67
SCREEN DRVVT: 30.7 SEC (ref 0–47)
STRESS TARGET HR: 156 BPM
THROMBIN TIME: 15.2 SEC (ref 0–23)

## 2020-02-27 PROCEDURE — 93306 TTE W/DOPPLER COMPLETE: CPT | Performed by: INTERNAL MEDICINE

## 2020-02-27 PROCEDURE — 93975 VASCULAR STUDY: CPT

## 2020-02-27 PROCEDURE — 93306 TTE W/DOPPLER COMPLETE: CPT

## 2020-02-27 RX ORDER — NIFEDIPINE 90 MG/1
90 TABLET, EXTENDED RELEASE ORAL
Qty: 90 TABLET | Refills: 1 | Status: SHIPPED | OUTPATIENT
Start: 2020-02-28

## 2020-02-27 RX ADMIN — ASPIRIN 81 MG: 81 TABLET, CHEWABLE ORAL at 09:40

## 2020-02-27 RX ADMIN — NIFEDIPINE 90 MG: 60 TABLET, FILM COATED, EXTENDED RELEASE ORAL at 09:39

## 2020-02-27 RX ADMIN — Medication 1 TABLET: at 09:40

## 2020-03-09 ENCOUNTER — TRANSCRIBE ORDERS (OUTPATIENT)
Dept: ULTRASOUND IMAGING | Facility: HOSPITAL | Age: 35
End: 2020-03-09

## 2020-03-09 ENCOUNTER — OFFICE VISIT (OUTPATIENT)
Dept: OBSTETRICS AND GYNECOLOGY | Facility: CLINIC | Age: 35
End: 2020-03-09

## 2020-03-09 ENCOUNTER — HOSPITAL ENCOUNTER (OUTPATIENT)
Dept: ULTRASOUND IMAGING | Facility: HOSPITAL | Age: 35
Discharge: HOME | End: 2020-03-09
Admitting: OBSTETRICS & GYNECOLOGY

## 2020-03-09 ENCOUNTER — TELEPHONE (OUTPATIENT)
Dept: OBSTETRICS AND GYNECOLOGY | Facility: CLINIC | Age: 35
End: 2020-03-09

## 2020-03-09 VITALS
HEART RATE: 116 BPM | BODY MASS INDEX: 35.83 KG/M2 | SYSTOLIC BLOOD PRESSURE: 142 MMHG | HEIGHT: 61 IN | TEMPERATURE: 98 F | WEIGHT: 189.8 LBS | DIASTOLIC BLOOD PRESSURE: 88 MMHG

## 2020-03-09 DIAGNOSIS — O16.2 HYPERTENSION AFFECTING PREGNANCY IN SECOND TRIMESTER: Primary | ICD-10-CM

## 2020-03-09 DIAGNOSIS — O09.521 MULTIGRAVIDA OF ADVANCED MATERNAL AGE IN FIRST TRIMESTER: ICD-10-CM

## 2020-03-09 DIAGNOSIS — O09.522 AMA (ADVANCED MATERNAL AGE) MULTIGRAVIDA 35+, SECOND TRIMESTER: ICD-10-CM

## 2020-03-09 DIAGNOSIS — O16.2 HYPERTENSION AFFECTING PREGNANCY IN SECOND TRIMESTER: ICD-10-CM

## 2020-03-09 PROCEDURE — 76815 OB US LIMITED FETUS(S): CPT | Performed by: OBSTETRICS & GYNECOLOGY

## 2020-03-09 PROCEDURE — 99214 OFFICE O/P EST MOD 30 MIN: CPT | Performed by: OBSTETRICS & GYNECOLOGY

## 2020-03-09 PROCEDURE — 76815 OB US LIMITED FETUS(S): CPT

## 2020-03-09 RX ORDER — NIFEDIPINE 30 MG/1
TABLET, EXTENDED RELEASE ORAL DAILY
COMMUNITY
Start: 2020-03-01

## 2020-03-09 RX ORDER — PNV,CALCIUM 72/IRON/FOLIC ACID 27 MG-1 MG
TABLET ORAL
Status: ON HOLD | COMMUNITY
Start: 2020-02-04 | End: 2020-08-14

## 2020-03-09 NOTE — TELEPHONE ENCOUNTER
Reviewed patient labs from inpatient admission. Appropriately, was tested for APLAS. Notable for elevated beta 2 glycoprotein IgG. This is not diagnostic of APLAS but is potential for (requires clinical criteria plus lab abnormality x 2 12 weeks apart). Have left message with Ms Conklin as she will need to start prophylactic Lovenox as well as low dose ASA - at least until we can repeat the lab in early June.   She will likely need Lovenox teaching. Will place prescription and have her come in for Lovenox teaching

## 2020-03-10 ENCOUNTER — TELEPHONE (OUTPATIENT)
Dept: OBSTETRICS AND GYNECOLOGY | Facility: CLINIC | Age: 35
End: 2020-03-10

## 2020-03-10 NOTE — TELEPHONE ENCOUNTER
Spoke with patient. Explained the nature of APLAS and her possible diagnosis (repeat labs in 3 months pending). We discussed the risks (bleeding) vs benefits (decreased risk of OB complications and blood clot) of prophylactic anticoagulation. She would like to start ppx Lovenox. Prescription placed. She will  and come to [facility] clinic for Lovenox teaching at 3:30 today.

## 2020-03-10 NOTE — TELEPHONE ENCOUNTER
Left message with patient on cell and home numbers to call regarding lab results and need for prophylactic Lovenox.

## 2020-06-24 ENCOUNTER — HOSPITAL ENCOUNTER (OUTPATIENT)
Facility: HOSPITAL | Age: 35
Setting detail: OBSERVATION
Discharge: HOME | End: 2020-06-25
Attending: OBSTETRICS & GYNECOLOGY | Admitting: OBSTETRICS & GYNECOLOGY

## 2020-06-24 LAB
ALBUMIN SERPL-MCNC: 3.5 G/DL (ref 3.5–5.2)
ALBUMIN/GLOB SERPL: 1.1 G/DL
ALP SERPL-CCNC: 81 U/L (ref 39–117)
ALT SERPL W P-5'-P-CCNC: 43 U/L (ref 1–33)
ANION GAP SERPL CALCULATED.3IONS-SCNC: 11.1 MMOL/L (ref 5–15)
AST SERPL-CCNC: 42 U/L (ref 1–32)
BASOPHILS # BLD AUTO: 0.03 10*3/MM3 (ref 0–0.2)
BASOPHILS NFR BLD AUTO: 0.3 % (ref 0–1.5)
BILIRUB SERPL-MCNC: 0.2 MG/DL (ref 0.2–1.2)
BUN BLD-MCNC: 5 MG/DL (ref 6–20)
BUN/CREAT SERPL: 8.2 (ref 7–25)
CALCIUM SPEC-SCNC: 8.6 MG/DL (ref 8.6–10.5)
CHLORIDE SERPL-SCNC: 103 MMOL/L (ref 98–107)
CO2 SERPL-SCNC: 18.9 MMOL/L (ref 22–29)
CREAT BLD-MCNC: 0.61 MG/DL (ref 0.57–1)
DEPRECATED RDW RBC AUTO: 42.7 FL (ref 37–54)
EOSINOPHIL # BLD AUTO: 0.12 10*3/MM3 (ref 0–0.4)
EOSINOPHIL NFR BLD AUTO: 1.2 % (ref 0.3–6.2)
ERYTHROCYTE [DISTWIDTH] IN BLOOD BY AUTOMATED COUNT: 13.6 % (ref 12.3–15.4)
GFR SERPL CREATININE-BSD FRML MDRD: 110 ML/MIN/1.73
GLOBULIN UR ELPH-MCNC: 3.2 GM/DL
GLUCOSE BLD-MCNC: 158 MG/DL (ref 65–99)
HCT VFR BLD AUTO: 31.4 % (ref 34–46.6)
HGB BLD-MCNC: 10.7 G/DL (ref 12–15.9)
IMM GRANULOCYTES # BLD AUTO: 0.14 10*3/MM3 (ref 0–0.05)
IMM GRANULOCYTES NFR BLD AUTO: 1.4 % (ref 0–0.5)
LYMPHOCYTES # BLD AUTO: 1.71 10*3/MM3 (ref 0.7–3.1)
LYMPHOCYTES NFR BLD AUTO: 16.7 % (ref 19.6–45.3)
MCH RBC QN AUTO: 29 PG (ref 26.6–33)
MCHC RBC AUTO-ENTMCNC: 34.1 G/DL (ref 31.5–35.7)
MCV RBC AUTO: 85.1 FL (ref 79–97)
MONOCYTES # BLD AUTO: 0.54 10*3/MM3 (ref 0.1–0.9)
MONOCYTES NFR BLD AUTO: 5.3 % (ref 5–12)
NEUTROPHILS # BLD AUTO: 7.69 10*3/MM3 (ref 1.7–7)
NEUTROPHILS NFR BLD AUTO: 75.1 % (ref 42.7–76)
NRBC BLD AUTO-RTO: 0 /100 WBC (ref 0–0.2)
PLATELET # BLD AUTO: 280 10*3/MM3 (ref 140–450)
PMV BLD AUTO: 10.6 FL (ref 6–12)
POTASSIUM BLD-SCNC: 3.2 MMOL/L (ref 3.5–5.2)
PROT SERPL-MCNC: 6.7 G/DL (ref 6–8.5)
RBC # BLD AUTO: 3.69 10*6/MM3 (ref 3.77–5.28)
SODIUM BLD-SCNC: 133 MMOL/L (ref 136–145)
WBC NRBC COR # BLD: 10.23 10*3/MM3 (ref 3.4–10.8)

## 2020-06-24 PROCEDURE — 80053 COMPREHEN METABOLIC PANEL: CPT | Performed by: OBSTETRICS & GYNECOLOGY

## 2020-06-24 PROCEDURE — 85025 COMPLETE CBC W/AUTO DIFF WBC: CPT | Performed by: OBSTETRICS & GYNECOLOGY

## 2020-06-24 PROCEDURE — 59025 FETAL NON-STRESS TEST: CPT

## 2020-06-24 PROCEDURE — G0378 HOSPITAL OBSERVATION PER HR: HCPCS

## 2020-06-24 RX ORDER — PRENATAL VIT NO.126/IRON/FOLIC 28MG-0.8MG
1 TABLET ORAL DAILY
Status: DISCONTINUED | OUTPATIENT
Start: 2020-06-25 | End: 2020-06-25 | Stop reason: HOSPADM

## 2020-06-24 RX ORDER — NIFEDIPINE 60 MG/1
120 TABLET, EXTENDED RELEASE ORAL DAILY
Status: DISCONTINUED | OUTPATIENT
Start: 2020-06-24 | End: 2020-06-25 | Stop reason: HOSPADM

## 2020-06-24 RX ORDER — ASPIRIN 81 MG/1
81 TABLET, CHEWABLE ORAL DAILY
Status: DISCONTINUED | OUTPATIENT
Start: 2020-06-24 | End: 2020-06-25 | Stop reason: HOSPADM

## 2020-06-24 NOTE — PLAN OF CARE
Problem: Patient Care Overview  Goal: Plan of Care Review  Outcome: Ongoing (interventions implemented as appropriate)  Flowsheets  Taken 6/24/2020 1849  Progress: no change  Outcome Summary: VSS.  RNST.  Pt denies H/A, LOF, VB, visual disturbances, or abdominal pain.  24 hour urine in progress.  Pt resting comfortably.  Taken 6/24/2020 0982  Plan of Care Reviewed With: patient

## 2020-06-24 NOTE — H&P
[facility]  Obstetric History and Physical    Patient Name: Salma Conklin  :  1985  MRN:  [id number]        Chief Complaint   Patient presents with   • Elevated Blood Pressure     Outpatient; Pt sent from office for elevated b/p and labs.  Pt denies HA, visual disturbances, LOF, VB, ctx, or epigastric pain.  Pt reports good FM.       Subjective     Patient is a 35 y.o. female  currently at 28w0d, who presents from office with increased blood pressure 162/103.    CHTN- takes procardia LX 120mg daily. Checks bp at home regularly 130/80s mostly. No si/sx preeclampsia.    Hx of 24 wk IUFD with superimposed severe preeclampsia last preg.    Antiphospholipid ab syndrome - on lovenox    Feels great.  Good fm. No vb. No LOF. No contns. No headache, no epigastric pain.    Had 1hr in office today. Labs were drawn at the approp time so glc will be 1hr              Past Medical History: Past Medical History:   Diagnosis Date   • Gestational hypertension    • Ovarian cyst     Right side      Past Surgical History History reviewed. No pertinent surgical history.   Family History: Family History   Problem Relation Age of Onset   • Birth defects Neg Hx       Social History:  reports that she quit smoking about 12 years ago. She has never used smokeless tobacco.   reports that she does not drink alcohol.   reports that she does not use drugs.    Allergies:     Patient has no known allergies.     Past OB History:       OB History    Para Term  AB Living   3 2 1 1 0 1   SAB TAB Ectopic Molar Multiple Live Births   0 0 0 0 0 1      # Outcome Date GA Lbr Alfredo/2nd Weight Sex Delivery Anes PTL Lv   3 Current            2  18 24w3d       FD   1 Term 04    F Vag-Spont  N PEE      Complications: Hypertension affecting pregnancy in third trimester         Objective       Vital Signs Range for the last 24 hours  Temperature: Temp:  [97.9 °F (36.6 °C)] 97.9 °F (36.6 °C)   Temp Source: Temp  src: Oral   BP: BP: (131-142)/(69-85) 132/75   Pulse: Heart Rate:  [72-77] 76   Respirations: Resp:  [18] 18             Physical Exam:        General :    Alert and cooperative in NAD   Lungs:   Clear to auscultation bilaterally   CV:   Regular rate and rhythm   Abdomen:  Gravid, non-tender, no masses   Vaginal exam: deferred     LE:   trace edema. DTR- minimal <1+     FHR:   Cat 1. Great fo 28wks   [place]:   Not audie        Results from last 7 days   Lab Units 06/24/20  1011   WBC 10*3/mm3 10.23   HEMOGLOBIN g/dL 10.7*   HEMATOCRIT % 31.4*   PLATELETS 10*3/mm3 280     Results from last 7 days   Lab Units 06/24/20  1011   SODIUM mmol/L 133*   POTASSIUM mmol/L 3.2*   CHLORIDE mmol/L 103   CO2 mmol/L 18.9*   BUN mg/dL 5*   CREATININE mg/dL 0.61   CALCIUM mg/dL 8.6   BILIRUBIN mg/dL 0.2   ALK PHOS U/L 81   ALT (SGPT) U/L 43*   AST (SGOT) U/L 42*   GLUCOSE mg/dL 158*         A/P:  1- 28.0 wks- reasurring fetal status.    2. CHTN- controlled on procardia 120mg XL. bp have been fine here but considering poor reproductive hx and slight inc LFTs, will keep for 24hr urine protein and repeat labs in am. Pt agreeable and wants to do aggressively monitor. Clinically looks great.    3. Slight inc LFTS- repeat in am    4. abnl 1hr glucose -- 158. Will need 3hr gtt.      Problem List Items Addressed This Visit     None                  Jolene Campbell MD  6/24/2020  14:22

## 2020-06-25 VITALS
SYSTOLIC BLOOD PRESSURE: 136 MMHG | WEIGHT: 199 LBS | OXYGEN SATURATION: 99 % | TEMPERATURE: 98.7 F | RESPIRATION RATE: 18 BRPM | DIASTOLIC BLOOD PRESSURE: 83 MMHG | HEIGHT: 61 IN | HEART RATE: 75 BPM | BODY MASS INDEX: 37.57 KG/M2

## 2020-06-25 LAB
ALBUMIN SERPL-MCNC: 3.2 G/DL (ref 3.5–5.2)
ALBUMIN/GLOB SERPL: 1 G/DL
ALP SERPL-CCNC: 72 U/L (ref 39–117)
ALT SERPL W P-5'-P-CCNC: 41 U/L (ref 1–33)
ANION GAP SERPL CALCULATED.3IONS-SCNC: 11.8 MMOL/L (ref 5–15)
AST SERPL-CCNC: 35 U/L (ref 1–32)
BASOPHILS # BLD AUTO: 0.05 10*3/MM3 (ref 0–0.2)
BASOPHILS NFR BLD AUTO: 0.5 % (ref 0–1.5)
BILIRUB SERPL-MCNC: 0.2 MG/DL (ref 0.2–1.2)
BUN BLD-MCNC: 5 MG/DL (ref 6–20)
BUN/CREAT SERPL: 10.4 (ref 7–25)
CALCIUM SPEC-SCNC: 8.3 MG/DL (ref 8.6–10.5)
CHLORIDE SERPL-SCNC: 106 MMOL/L (ref 98–107)
CO2 SERPL-SCNC: 21.2 MMOL/L (ref 22–29)
CREAT BLD-MCNC: 0.48 MG/DL (ref 0.57–1)
DEPRECATED RDW RBC AUTO: 41.6 FL (ref 37–54)
EOSINOPHIL # BLD AUTO: 0.21 10*3/MM3 (ref 0–0.4)
EOSINOPHIL NFR BLD AUTO: 2 % (ref 0.3–6.2)
ERYTHROCYTE [DISTWIDTH] IN BLOOD BY AUTOMATED COUNT: 13.5 % (ref 12.3–15.4)
GFR SERPL CREATININE-BSD FRML MDRD: 146 ML/MIN/1.73
GLOBULIN UR ELPH-MCNC: 3.1 GM/DL
GLUCOSE 1H P 100 G GLC PO SERPL-MCNC: 195 MG/DL (ref 74–180)
GLUCOSE 2H P 100 G GLC PO SERPL-MCNC: 169 MG/DL (ref 74–155)
GLUCOSE 3H P 100 G GLC PO SERPL-MCNC: 118 MG/DL (ref 74–140)
GLUCOSE BLD-MCNC: 92 MG/DL (ref 65–99)
GLUCOSE P FAST SERPL-MCNC: 90 MG/DL (ref 65–99)
HCT VFR BLD AUTO: 30.1 % (ref 34–46.6)
HGB BLD-MCNC: 10.2 G/DL (ref 12–15.9)
IMM GRANULOCYTES # BLD AUTO: 0.16 10*3/MM3 (ref 0–0.05)
IMM GRANULOCYTES NFR BLD AUTO: 1.5 % (ref 0–0.5)
LYMPHOCYTES # BLD AUTO: 1.89 10*3/MM3 (ref 0.7–3.1)
LYMPHOCYTES NFR BLD AUTO: 17.7 % (ref 19.6–45.3)
MCH RBC QN AUTO: 28.4 PG (ref 26.6–33)
MCHC RBC AUTO-ENTMCNC: 33.9 G/DL (ref 31.5–35.7)
MCV RBC AUTO: 83.8 FL (ref 79–97)
MONOCYTES # BLD AUTO: 0.7 10*3/MM3 (ref 0.1–0.9)
MONOCYTES NFR BLD AUTO: 6.5 % (ref 5–12)
NEUTROPHILS # BLD AUTO: 7.68 10*3/MM3 (ref 1.7–7)
NEUTROPHILS NFR BLD AUTO: 71.8 % (ref 42.7–76)
NRBC BLD AUTO-RTO: 0 /100 WBC (ref 0–0.2)
PLATELET # BLD AUTO: 293 10*3/MM3 (ref 140–450)
PMV BLD AUTO: 10.8 FL (ref 6–12)
POTASSIUM BLD-SCNC: 3.3 MMOL/L (ref 3.5–5.2)
PROT 24H UR-MRATE: 216 MG/24HOURS (ref 0–150)
PROT SERPL-MCNC: 6.3 G/DL (ref 6–8.5)
RBC # BLD AUTO: 3.59 10*6/MM3 (ref 3.77–5.28)
SODIUM BLD-SCNC: 139 MMOL/L (ref 136–145)
WBC NRBC COR # BLD: 10.69 10*3/MM3 (ref 3.4–10.8)

## 2020-06-25 PROCEDURE — 82952 GTT-ADDED SAMPLES: CPT | Performed by: OBSTETRICS & GYNECOLOGY

## 2020-06-25 PROCEDURE — 84156 ASSAY OF PROTEIN URINE: CPT | Performed by: OBSTETRICS & GYNECOLOGY

## 2020-06-25 PROCEDURE — 96372 THER/PROPH/DIAG INJ SC/IM: CPT

## 2020-06-25 PROCEDURE — 25010000002 ENOXAPARIN PER 10 MG: Performed by: OBSTETRICS & GYNECOLOGY

## 2020-06-25 PROCEDURE — 59025 FETAL NON-STRESS TEST: CPT

## 2020-06-25 PROCEDURE — 82951 GLUCOSE TOLERANCE TEST (GTT): CPT | Performed by: OBSTETRICS & GYNECOLOGY

## 2020-06-25 PROCEDURE — 80053 COMPREHEN METABOLIC PANEL: CPT | Performed by: OBSTETRICS & GYNECOLOGY

## 2020-06-25 PROCEDURE — 81050 URINALYSIS VOLUME MEASURE: CPT | Performed by: OBSTETRICS & GYNECOLOGY

## 2020-06-25 PROCEDURE — 85025 COMPLETE CBC W/AUTO DIFF WBC: CPT | Performed by: OBSTETRICS & GYNECOLOGY

## 2020-06-25 PROCEDURE — G0378 HOSPITAL OBSERVATION PER HR: HCPCS

## 2020-06-25 RX ADMIN — ASPIRIN 81 MG: 81 TABLET, CHEWABLE ORAL at 10:51

## 2020-06-25 RX ADMIN — Medication 1 TABLET: at 10:50

## 2020-06-25 RX ADMIN — ENOXAPARIN SODIUM 40 MG: 40 INJECTION SUBCUTANEOUS at 10:51

## 2020-06-25 RX ADMIN — NIFEDIPINE 120 MG: 60 TABLET, FILM COATED, EXTENDED RELEASE ORAL at 10:50

## 2020-06-25 NOTE — NURSING NOTE
Lab called with results 90mg/dl. Pt to drink 100mg glucola now. Will call lab when pt has finished to start timing for tests

## 2020-06-25 NOTE — NON STRESS TEST
Salma Conklin, a  at 28w0d with an PHILLY of 2020, Date entered prior to episode creation, was seen at [facility] LABOR DELIVERY for a nonstress test.    Chief Complaint   Patient presents with   • Elevated Blood Pressure     Outpatient; Pt sent from office for elevated b/p and labs.  Pt denies HA, visual disturbances, LOF, VB, ctx, or epigastric pain.  Pt reports good FM.       Patient Active Problem List   Diagnosis   • Pregnancy   • Hypertension affecting pregnancy in second trimester   • HTN complicating peripregnancy, antepartum, first trimester   • Hypertension affecting pregnancy       Start Time: 0940  Stop Time: 1300

## 2020-06-25 NOTE — PLAN OF CARE
Problem: Patient Care Overview  Goal: Plan of Care Review  Flowsheets  Taken 6/25/2020 5171  Progress: improving  Outcome Summary: VSS, RNST, Patient denies any headache, LOF, VB, visual distubances, or abdominal pain. 24 hour urine results. Patient will be discharged today.  Taken 6/25/2020 5847  Plan of Care Reviewed With: patient

## 2020-06-25 NOTE — PROGRESS NOTES
[facility]  Obstetric Progress Note    Patient Name: Salma Conklin  :  1985  MRN:  [id number]  Hospital Day: 1  EGA: 28w1d      Subjective   Feels well.  No c/o.  Good FM.          Objective     VS:  Vital Signs Range for the last 24 hours  Temperature: Temp:  [97.9 °F (36.6 °C)-98.7 °F (37.1 °C)] 98.7 °F (37.1 °C)   Temp Source: Temp src: Oral   BP: BP: (124-156)/(69-90) 124/73   Pulse: Heart Rate:  [72-94] 79   Respirations: Resp:  [16-18] 18                   Physical Examination:     General :  Alert in NAD  Abdomen: Gravid, Fundus - NT  Ext: NT       Lab results reviewed:  CBC:   Lab Results   Component Value Date    WBC 10.69 2020    HGB 10.2 (L) 2020    HCT 30.1 (L) 2020            A/P: 28w1d-- CHTN w/ elevated BP.  24 hr urine in progress.   Labs stable- slight increase in LFTs initially but stable today   Doing 3hr gtt this am.         Pregnancy              Fern Pool Kee, APRN  2020  09:23

## 2020-06-25 NOTE — PLAN OF CARE
Problem: Patient Care Overview  Goal: Plan of Care Review  Outcome: Ongoing (interventions implemented as appropriate)  Flowsheets  Taken 6/24/2020 1849 by Neeta Kidd RN  Progress: no change  Outcome Summary: VSS.  RNST.  Pt denies H/A, LOF, VB, visual disturbances, or abdominal pain.  24 hour urine in progress.  Pt resting comfortably.  Taken 6/24/2020 1935 by Neeta Kidd RN  Plan of Care Reviewed With: patient  Goal: Individualization and Mutuality  Outcome: Ongoing (interventions implemented as appropriate)  Flowsheets (Taken 6/24/2020 1849 by Neeta Kidd, RN)  Patient Specific Goals (Include Timeframe): VSS remain stable.  24 hour urine less than 300 when completed.  How to Address Anxieties/Fears: keep informed of POC  Patient Specific Interventions: VS q 4, Meds,  What Information Would Help Us Give You More Personalized Care?: CHTN, hx of 24 week IUFD r/t pre-eclampsia  How Would You and/or Your Support Person Like to Participate in Your Care?: Keep family informed of POC  What Anxieties, Fears, Concerns, or Questions Do You Have About Your Care?: worried about possible pregnancy complications  Patient Specific Preferences: Wear hospital gown  Goal: Discharge Needs Assessment  Outcome: Ongoing (interventions implemented as appropriate)  Flowsheets  Taken 6/24/2020 1849 by Neeta Kidd RN  Equipment Needed After Discharge: none  Anticipated Changes Related to Illness: none  Transportation Anticipated: car, drives self;family or friend will provide  Transportation Concerns: car, none  Concerns to be Addressed: no discharge needs identified  Readmission Within the Last 30 Days: no previous admission in last 30 days  Patient/Family Anticipated Services at Transition: none  Patient/Family Anticipates Transition to: home  Taken 6/24/2020 1544 by Neeta Kidd, RN  Equipment Currently Used at Home: other (see comments) (digital blood pressure cuff)  Goal: Interprofessional Rounds/Family  Conf  Outcome: Ongoing (interventions implemented as appropriate)  Flowsheets (Taken 6/24/2020 1849 by Neeta Kidd, RN)  Summary: POC discussed with pt  Participants: physician;nursing;patient     Problem: Hypertensive Disorders in Pregnancy (Adult,Obstetrics,Pediatric)  Goal: Signs and Symptoms of Listed Potential Problems Will be Absent, Minimized or Managed (Hypertensive Disorders in Pregnancy)  Outcome: Ongoing (interventions implemented as appropriate)  Flowsheets (Taken 6/24/2020 1849 by Neeta Kidd, RN)  Problems Assessed (Hypertensive Disorders in Pregnancy): all  Problems Present (Hypertensive/in PG): situational response     Problem: Prenatal Patient, Hospitalized (Adult,Obstetrics,Pediatric)  Goal: Signs and Symptoms of Listed Potential Problems Will be Absent, Minimized or Managed (Prenatal Patient, Hospitalized)  Outcome: Ongoing (interventions implemented as appropriate)  Flowsheets (Taken 6/24/2020 1849 by Neeta Kidd, RN)  Problems Assessed (Prenatal Patient): all  Problems Present (Prenatal Patient): hypertensive disorders

## 2020-06-25 NOTE — NON STRESS TEST
Salma Conklin, a  at 28w1d with an PHILLY of 2020, Date entered prior to episode creation, was seen at [facility] ANTEPARTUM UNIT for a nonstress test.    Chief Complaint   Patient presents with   • Elevated Blood Pressure     Outpatient; Pt sent from office for elevated b/p and labs.  Pt denies HA, visual disturbances, LOF, VB, ctx, or epigastric pain.  Pt reports good FM.       Patient Active Problem List   Diagnosis   • Pregnancy   • Hypertension affecting pregnancy in second trimester   • HTN complicating peripregnancy, antepartum, first trimester   • Hypertension affecting pregnancy       Start Time: 847  Stop Time: 932

## 2020-06-25 NOTE — NURSING NOTE
"Diabetes Education  Assessment/Teaching    Patient Name:  Salma Conklin  YOB: 1985  MRN: [id number]  Admit Date:  6/24/2020      Assessment Date:  6/25/2020    Most Recent Value   General Information    Referral From:  MD order. Meet with 36 y/o at bedside to initiate GDM ed.    Height  154.9 cm (61\")   Height Method  Stated   Weight  90.3 kg (199 lb)   Weight Method  Standing scale   Diabetes History   What type of diabetes do you have?  Gestational   Length of Diabetes Diagnosis  Newly diagnosed this hospital stay   Do you test your blood sugar at home?  no   Education Preferences   Barriers to Learning  --no barriers noted this afternoon.    Assessment Topics   Healthy Eating - Assessment  Needs education   Taking Medication - Assessment  N/A- pt to trial diet-control and f/u with OB nxt week.    Reducing Risk - Assessment  Needs education   Healthy Coping - Assessment  Competent   DM Goals            Most Recent Value   DM Education Needs   Meter  Needs meter. Meter/rxs are called in per RN report.    Frequency of Testing  4 times a day. FBG and 2h pp.    Medication  --Discuss w/pt the plan is trial of BG control with meal planning.    Reducing Risks  Blood pressure   Healthy Eating  --Discuss meal plan with 3 small meals and snacks prn. Advise pt of beverages she needs to avoid and foods she needs to limit.    Healthy Coping  Appropriate   Gestational  Fetal complications   Discharge Plan  Home, Follow-up with MD. advise pt to bring log with BGs to OB nxt week at f/u.   Motivation  Engaged   Teaching Method  Explanation, Discussion   Patient Response  Verbalized understanding            Electronically signed by:  Cece Mayo RN, BSN, CDE   06/25/20 16:46  "

## 2020-06-25 NOTE — NON STRESS TEST
Salma Conklin, a  at 28w0d with an PHILLY of 2020, Date entered prior to episode creation, was seen at [facility]TE[facility]UM UN[facility] for a nonstress test.    Chief Complaint   Patient presents with   • Elevated Blood Pressure     Outpatient; Pt sent from office for elevated b/p and labs.  Pt denies HA, visual disturbances, LOF, VB, ctx, or epigastric pain.  Pt reports good FM.       Patient Active Problem List   Diagnosis   • Pregnancy   • Hypertension affecting pregnancy in second trimester   • HTN complicating peripregnancy, antepartum, first trimester   • Hypertension affecting pregnancy       Start Time:   Stop Time:     Interpretation A  Nonstress Test Interpretation A: Reactive (20 : Rhonda Parham RN)

## 2020-06-26 ENCOUNTER — TRANSCRIBE ORDERS (OUTPATIENT)
Dept: DIABETES SERVICES | Facility: HOSPITAL | Age: 35
End: 2020-06-26

## 2020-06-26 DIAGNOSIS — O24.410 DIET CONTROLLED GESTATIONAL DIABETES MELLITUS (GDM) IN SECOND TRIMESTER: Primary | ICD-10-CM

## 2020-06-26 NOTE — DISCHARGE SUMMARY
Date of Discharge:  6/26/2020    Discharge Diagnosis: HTN w/ r/o superimposed preE    Presenting Problem/History of Present Illness  Pregnancy [Z34.90]  Pregnancy [Z34.90]       Hospital Course  Patient is a 35 y.o. female presented with elevated BP on routine prenatal visit.  Hx poor preg outcome in previous pregnancy.  Baseline labs w/ slightly elevated LFTs.  24hr urine WNL.  BP much better on admission.  Did do 3hr gtt in house and failed-- GDM-- educator met w/ her.  Will f/u in office next week.      Procedures Performed         Consults:   Consults     No orders found from 5/26/2020 to 6/25/2020.          Pertinent Test Results: labs: 24hr urine    Condition on Discharge:  Feels well.  Reports good FM.  No vb.  Ready for discharge    Vital Signs  Heart Rate:  [75] 75  Resp:  [18] 18  BP: (136)/(83) 136/83    Physical Exam:  General: Awake and alert  Abdomen: Gravid, soft,  non tender  Extremities:  Calves NT bilaterally        Discharge Disposition  Home or Self Care    Discharge Medications     Discharge Medications      Continue These Medications      Instructions Start Date   aspirin 81 MG chewable tablet   81 mg, Oral, Daily      enoxaparin 40 MG/0.4ML solution syringe  Commonly known as:  LOVENOX   40 mg, Subcutaneous, Every 24 Hours Scheduled      NIFEdipine XL 90 MG 24 hr tablet  Commonly known as:  PROCARDIA XL   90 mg, Oral, Every 24 Hours Scheduled      NIFEdipine XL 30 MG 24 hr tablet  Commonly known as:  PROCARDIA XL   Oral, Daily      prenatal (CLASSIC) vitamin 28-0.8 MG tablet tablet   1 tablet, Oral, Daily      PrePLUS 27-1 MG tablet   No dose, route, or frequency recorded.             Discharge Diet:     Activity at Discharge:     Follow-up Appointments  No future appointments.      Test Results Pending at Discharge       JAVIER Watkins  06/26/20  09:07

## 2020-07-28 ENCOUNTER — HOSPITAL ENCOUNTER (OUTPATIENT)
Facility: HOSPITAL | Age: 35
Discharge: HOME | End: 2020-07-28
Attending: OBSTETRICS & GYNECOLOGY | Admitting: OBSTETRICS & GYNECOLOGY

## 2020-07-28 VITALS
BODY MASS INDEX: 37.95 KG/M2 | HEART RATE: 68 BPM | HEIGHT: 61 IN | TEMPERATURE: 98.9 F | RESPIRATION RATE: 16 BRPM | DIASTOLIC BLOOD PRESSURE: 79 MMHG | OXYGEN SATURATION: 99 % | SYSTOLIC BLOOD PRESSURE: 141 MMHG | WEIGHT: 201 LBS

## 2020-07-28 LAB
ALBUMIN SERPL-MCNC: 3.3 G/DL (ref 3.5–5.2)
ALBUMIN/GLOB SERPL: 0.9 G/DL
ALP SERPL-CCNC: 107 U/L (ref 39–117)
ALT SERPL W P-5'-P-CCNC: 37 U/L (ref 1–33)
ANION GAP SERPL CALCULATED.3IONS-SCNC: 10.4 MMOL/L (ref 5–15)
AST SERPL-CCNC: 30 U/L (ref 1–32)
BASOPHILS # BLD AUTO: 0.04 10*3/MM3 (ref 0–0.2)
BASOPHILS NFR BLD AUTO: 0.4 % (ref 0–1.5)
BILIRUB SERPL-MCNC: 0.2 MG/DL (ref 0–1.2)
BUN SERPL-MCNC: 7 MG/DL (ref 6–20)
BUN/CREAT SERPL: 13.5 (ref 7–25)
CALCIUM SPEC-SCNC: 9 MG/DL (ref 8.6–10.5)
CHLORIDE SERPL-SCNC: 104 MMOL/L (ref 98–107)
CO2 SERPL-SCNC: 20.6 MMOL/L (ref 22–29)
CREAT SERPL-MCNC: 0.52 MG/DL (ref 0.57–1)
DEPRECATED RDW RBC AUTO: 43.2 FL (ref 37–54)
EOSINOPHIL # BLD AUTO: 0.12 10*3/MM3 (ref 0–0.4)
EOSINOPHIL NFR BLD AUTO: 1.3 % (ref 0.3–6.2)
ERYTHROCYTE [DISTWIDTH] IN BLOOD BY AUTOMATED COUNT: 14 % (ref 12.3–15.4)
GFR SERPL CREATININE-BSD FRML MDRD: 133 ML/MIN/1.73
GLOBULIN UR ELPH-MCNC: 3.6 GM/DL
GLUCOSE SERPL-MCNC: 103 MG/DL (ref 65–99)
HCT VFR BLD AUTO: 32.9 % (ref 34–46.6)
HGB BLD-MCNC: 11.4 G/DL (ref 12–15.9)
IMM GRANULOCYTES # BLD AUTO: 0.06 10*3/MM3 (ref 0–0.05)
IMM GRANULOCYTES NFR BLD AUTO: 0.7 % (ref 0–0.5)
LYMPHOCYTES # BLD AUTO: 1.51 10*3/MM3 (ref 0.7–3.1)
LYMPHOCYTES NFR BLD AUTO: 16.4 % (ref 19.6–45.3)
MCH RBC QN AUTO: 29 PG (ref 26.6–33)
MCHC RBC AUTO-ENTMCNC: 34.7 G/DL (ref 31.5–35.7)
MCV RBC AUTO: 83.7 FL (ref 79–97)
MONOCYTES # BLD AUTO: 0.62 10*3/MM3 (ref 0.1–0.9)
MONOCYTES NFR BLD AUTO: 6.7 % (ref 5–12)
NEUTROPHILS NFR BLD AUTO: 6.84 10*3/MM3 (ref 1.7–7)
NEUTROPHILS NFR BLD AUTO: 74.5 % (ref 42.7–76)
NRBC BLD AUTO-RTO: 0 /100 WBC (ref 0–0.2)
PLATELET # BLD AUTO: 287 10*3/MM3 (ref 140–450)
PMV BLD AUTO: 10.8 FL (ref 6–12)
POTASSIUM SERPL-SCNC: 3.6 MMOL/L (ref 3.5–5.2)
PROT SERPL-MCNC: 6.9 G/DL (ref 6–8.5)
RBC # BLD AUTO: 3.93 10*6/MM3 (ref 3.77–5.28)
SODIUM SERPL-SCNC: 135 MMOL/L (ref 136–145)
WBC # BLD AUTO: 9.19 10*3/MM3 (ref 3.4–10.8)

## 2020-07-28 PROCEDURE — G0463 HOSPITAL OUTPT CLINIC VISIT: HCPCS

## 2020-07-28 PROCEDURE — 80053 COMPREHEN METABOLIC PANEL: CPT | Performed by: OBSTETRICS & GYNECOLOGY

## 2020-07-28 PROCEDURE — 85025 COMPLETE CBC W/AUTO DIFF WBC: CPT | Performed by: OBSTETRICS & GYNECOLOGY

## 2020-08-11 ENCOUNTER — HOSPITAL ENCOUNTER (OUTPATIENT)
Facility: HOSPITAL | Age: 35
Setting detail: OBSERVATION
Discharge: HOME | End: 2020-08-11
Attending: OBSTETRICS & GYNECOLOGY | Admitting: OBSTETRICS & GYNECOLOGY

## 2020-08-11 VITALS
WEIGHT: 198 LBS | OXYGEN SATURATION: 100 % | DIASTOLIC BLOOD PRESSURE: 85 MMHG | HEART RATE: 71 BPM | HEIGHT: 61 IN | BODY MASS INDEX: 37.38 KG/M2 | RESPIRATION RATE: 18 BRPM | TEMPERATURE: 97.9 F | SYSTOLIC BLOOD PRESSURE: 145 MMHG

## 2020-08-11 LAB
ALBUMIN SERPL-MCNC: 3.4 G/DL (ref 3.5–5.2)
ALBUMIN/GLOB SERPL: 1 G/DL
ALP SERPL-CCNC: 147 U/L (ref 39–117)
ALT SERPL W P-5'-P-CCNC: 33 U/L (ref 1–33)
ANION GAP SERPL CALCULATED.3IONS-SCNC: 15.1 MMOL/L (ref 5–15)
AST SERPL-CCNC: 34 U/L (ref 1–32)
BILIRUB SERPL-MCNC: 0.2 MG/DL (ref 0–1.2)
BUN SERPL-MCNC: 7 MG/DL (ref 6–20)
BUN/CREAT SERPL: 11.9 (ref 7–25)
CALCIUM SPEC-SCNC: 9.1 MG/DL (ref 8.6–10.5)
CHLORIDE SERPL-SCNC: 104 MMOL/L (ref 98–107)
CO2 SERPL-SCNC: 18.9 MMOL/L (ref 22–29)
CREAT SERPL-MCNC: 0.59 MG/DL (ref 0.57–1)
DEPRECATED RDW RBC AUTO: 41.3 FL (ref 37–54)
ERYTHROCYTE [DISTWIDTH] IN BLOOD BY AUTOMATED COUNT: 13.3 % (ref 12.3–15.4)
GFR SERPL CREATININE-BSD FRML MDRD: 115 ML/MIN/1.73
GLOBULIN UR ELPH-MCNC: 3.5 GM/DL
GLUCOSE SERPL-MCNC: 72 MG/DL (ref 65–99)
HCT VFR BLD AUTO: 35.4 % (ref 34–46.6)
HGB BLD-MCNC: 11.9 G/DL (ref 12–15.9)
MCH RBC QN AUTO: 28.3 PG (ref 26.6–33)
MCHC RBC AUTO-ENTMCNC: 33.6 G/DL (ref 31.5–35.7)
MCV RBC AUTO: 84.1 FL (ref 79–97)
PLATELET # BLD AUTO: 295 10*3/MM3 (ref 140–450)
PMV BLD AUTO: 11 FL (ref 6–12)
POTASSIUM SERPL-SCNC: 3.7 MMOL/L (ref 3.5–5.2)
PROT SERPL-MCNC: 6.9 G/DL (ref 6–8.5)
RBC # BLD AUTO: 4.21 10*6/MM3 (ref 3.77–5.28)
SODIUM SERPL-SCNC: 138 MMOL/L (ref 136–145)
WBC # BLD AUTO: 10.5 10*3/MM3 (ref 3.4–10.8)

## 2020-08-11 PROCEDURE — G0378 HOSPITAL OBSERVATION PER HR: HCPCS

## 2020-08-11 PROCEDURE — 85027 COMPLETE CBC AUTOMATED: CPT | Performed by: OBSTETRICS & GYNECOLOGY

## 2020-08-11 PROCEDURE — 59025 FETAL NON-STRESS TEST: CPT

## 2020-08-11 PROCEDURE — 80053 COMPREHEN METABOLIC PANEL: CPT | Performed by: OBSTETRICS & GYNECOLOGY

## 2020-08-11 NOTE — NON STRESS TEST
Salma Conklin, a  at 34w6d with an PHILLY of 2020, Date entered prior to episode creation, was seen at [facility] for a nonstress test.    Chief Complaint   Patient presents with   • Hypertension-Pregnant     OUTPATIENT- Pt sent from office for elevated BPs (160s/100s); denies vaginal  bleeding, leaking of fluid, ctx; good fetal movement; denies headache, epigastric pain, vision changes       Patient Active Problem List   Diagnosis   • Pregnancy   • Hypertension affecting pregnancy in second trimester   • HTN complicating peripregnancy, antepartum, first trimester   • Hypertension affecting pregnancy       Start Time:   Stop Time: 1236    Interpretation A  Nonstress Test Interpretation A: Reactive (20 1236 : Katelin Caldwell, RN)

## 2020-08-14 ENCOUNTER — HOSPITAL ENCOUNTER (OUTPATIENT)
Facility: HOSPITAL | Age: 35
Setting detail: OBSERVATION
Discharge: HOME | End: 2020-08-14
Attending: OBSTETRICS & GYNECOLOGY | Admitting: OBSTETRICS & GYNECOLOGY

## 2020-08-14 VITALS
HEIGHT: 61 IN | TEMPERATURE: 98 F | WEIGHT: 201 LBS | DIASTOLIC BLOOD PRESSURE: 83 MMHG | RESPIRATION RATE: 16 BRPM | BODY MASS INDEX: 37.95 KG/M2 | SYSTOLIC BLOOD PRESSURE: 154 MMHG | HEART RATE: 83 BPM

## 2020-08-14 LAB
ALBUMIN SERPL-MCNC: 3.5 G/DL (ref 3.5–5.2)
ALBUMIN/GLOB SERPL: 1 G/DL
ALP SERPL-CCNC: 137 U/L (ref 39–117)
ALT SERPL W P-5'-P-CCNC: 34 U/L (ref 1–33)
ANION GAP SERPL CALCULATED.3IONS-SCNC: 12 MMOL/L (ref 5–15)
AST SERPL-CCNC: 34 U/L (ref 1–32)
BILIRUB SERPL-MCNC: 0.2 MG/DL (ref 0–1.2)
BUN SERPL-MCNC: 7 MG/DL (ref 6–20)
BUN/CREAT SERPL: 11.5 (ref 7–25)
CALCIUM SPEC-SCNC: 9.1 MG/DL (ref 8.6–10.5)
CHLORIDE SERPL-SCNC: 106 MMOL/L (ref 98–107)
CO2 SERPL-SCNC: 18 MMOL/L (ref 22–29)
CREAT SERPL-MCNC: 0.61 MG/DL (ref 0.57–1)
DEPRECATED RDW RBC AUTO: 43 FL (ref 37–54)
ERYTHROCYTE [DISTWIDTH] IN BLOOD BY AUTOMATED COUNT: 14.1 % (ref 12.3–15.4)
GFR SERPL CREATININE-BSD FRML MDRD: 110 ML/MIN/1.73
GLOBULIN UR ELPH-MCNC: 3.6 GM/DL
GLUCOSE SERPL-MCNC: 75 MG/DL (ref 65–99)
HCT VFR BLD AUTO: 35 % (ref 34–46.6)
HGB BLD-MCNC: 12 G/DL (ref 12–15.9)
MCH RBC QN AUTO: 29.1 PG (ref 26.6–33)
MCHC RBC AUTO-ENTMCNC: 34.3 G/DL (ref 31.5–35.7)
MCV RBC AUTO: 84.7 FL (ref 79–97)
PLATELET # BLD AUTO: 285 10*3/MM3 (ref 140–450)
PMV BLD AUTO: 11.1 FL (ref 6–12)
POTASSIUM SERPL-SCNC: 3.9 MMOL/L (ref 3.5–5.2)
PROT SERPL-MCNC: 7.1 G/DL (ref 6–8.5)
RBC # BLD AUTO: 4.13 10*6/MM3 (ref 3.77–5.28)
SODIUM SERPL-SCNC: 136 MMOL/L (ref 136–145)
WBC # BLD AUTO: 10.72 10*3/MM3 (ref 3.4–10.8)

## 2020-08-14 PROCEDURE — 59025 FETAL NON-STRESS TEST: CPT

## 2020-08-14 PROCEDURE — 80053 COMPREHEN METABOLIC PANEL: CPT | Performed by: OBSTETRICS & GYNECOLOGY

## 2020-08-14 PROCEDURE — 85027 COMPLETE CBC AUTOMATED: CPT | Performed by: OBSTETRICS & GYNECOLOGY

## 2020-08-14 PROCEDURE — G0378 HOSPITAL OBSERVATION PER HR: HCPCS

## 2020-08-14 NOTE — NON STRESS TEST
Salma AMOR Conklin, a  at 35w2d with an PHILLY of 2020, Date entered prior to episode creation, was seen at [facility] DELIV[facility] for a nonstress test.    Chief Complaint   Patient presents with   • Hypertension-Pregnant     Pt arrives from office through triage for evaluation of BP        Patient Active Problem List   Diagnosis   • Pregnancy   • Hypertension affecting pregnancy in second trimester   • HTN complicating peripregnancy, antepartum, first trimester   • Hypertension affecting pregnancy       Start Time: 931  Stop Time:     Interpretation A  Nonstress Test Interpretation A: Reactive (20 1127 : Emily Horner, RN)

## 2020-08-25 LAB — EXTERNAL GROUP B STREP ANTIGEN: NORMAL

## 2020-08-27 ENCOUNTER — ANESTHESIA EVENT (OUTPATIENT)
Dept: LABOR AND DELIVERY | Facility: HOSPITAL | Age: 35
End: 2020-08-27

## 2020-08-27 ENCOUNTER — HOSPITAL ENCOUNTER (INPATIENT)
Facility: HOSPITAL | Age: 35
LOS: 3 days | Discharge: HOME | End: 2020-08-30
Attending: OBSTETRICS & GYNECOLOGY | Admitting: OBSTETRICS & GYNECOLOGY

## 2020-08-27 ENCOUNTER — ANESTHESIA (OUTPATIENT)
Dept: LABOR AND DELIVERY | Facility: HOSPITAL | Age: 35
End: 2020-08-27

## 2020-08-27 PROBLEM — O41.00X0 OLIGOHYDRAMNIOS ANTEPARTUM: Status: ACTIVE | Noted: 2020-08-27

## 2020-08-27 PROBLEM — O09.529 ADVANCED MATERNAL AGE IN MULTIGRAVIDA: Status: ACTIVE | Noted: 2020-08-27

## 2020-08-27 PROBLEM — O99.119 ANTIPHOSPHOLIPID ANTIBODY SYNDROME COMPLICATING PREGNANCY (HCC): Status: ACTIVE | Noted: 2020-08-27

## 2020-08-27 PROBLEM — D68.61 ANTIPHOSPHOLIPID ANTIBODY SYNDROME COMPLICATING PREGNANCY (HCC): Status: ACTIVE | Noted: 2020-08-27

## 2020-08-27 LAB
ABO GROUP BLD: NORMAL
ALBUMIN SERPL-MCNC: 3.8 G/DL (ref 3.5–5.2)
ALBUMIN/GLOB SERPL: 1.1 G/DL
ALP SERPL-CCNC: 159 U/L (ref 39–117)
ALT SERPL W P-5'-P-CCNC: 35 U/L (ref 1–33)
ANION GAP SERPL CALCULATED.3IONS-SCNC: 12.3 MMOL/L (ref 5–15)
APTT PPP: 28.1 SECONDS (ref 22.7–35.4)
AST SERPL-CCNC: 33 U/L (ref 1–32)
BASOPHILS # BLD AUTO: 0.05 10*3/MM3 (ref 0–0.2)
BASOPHILS NFR BLD AUTO: 0.5 % (ref 0–1.5)
BILIRUB SERPL-MCNC: 0.2 MG/DL (ref 0–1.2)
BLD GP AB SCN SERPL QL: NEGATIVE
BUN SERPL-MCNC: 6 MG/DL (ref 6–20)
BUN/CREAT SERPL: 10 (ref 7–25)
CALCIUM SPEC-SCNC: 8.9 MG/DL (ref 8.6–10.5)
CHLORIDE SERPL-SCNC: 100 MMOL/L (ref 98–107)
CO2 SERPL-SCNC: 20.7 MMOL/L (ref 22–29)
CREAT SERPL-MCNC: 0.6 MG/DL (ref 0.57–1)
DEPRECATED RDW RBC AUTO: 46.1 FL (ref 37–54)
EOSINOPHIL # BLD AUTO: 0.07 10*3/MM3 (ref 0–0.4)
EOSINOPHIL NFR BLD AUTO: 0.6 % (ref 0.3–6.2)
ERYTHROCYTE [DISTWIDTH] IN BLOOD BY AUTOMATED COUNT: 14.5 % (ref 12.3–15.4)
EXPIRATION DATE: ABNORMAL
GFR SERPL CREATININE-BSD FRML MDRD: 112 ML/MIN/1.73
GLOBULIN UR ELPH-MCNC: 3.4 GM/DL
GLUCOSE SERPL-MCNC: 77 MG/DL (ref 65–99)
HCT VFR BLD AUTO: 37.9 % (ref 34–46.6)
HGB BLD-MCNC: 12.3 G/DL (ref 12–15.9)
IMM GRANULOCYTES # BLD AUTO: 0.1 10*3/MM3 (ref 0–0.05)
IMM GRANULOCYTES NFR BLD AUTO: 0.9 % (ref 0–0.5)
LYMPHOCYTES # BLD AUTO: 2.58 10*3/MM3 (ref 0.7–3.1)
LYMPHOCYTES NFR BLD AUTO: 23.8 % (ref 19.6–45.3)
Lab: ABNORMAL
MCH RBC QN AUTO: 28.1 PG (ref 26.6–33)
MCHC RBC AUTO-ENTMCNC: 32.5 G/DL (ref 31.5–35.7)
MCV RBC AUTO: 86.7 FL (ref 79–97)
MONOCYTES # BLD AUTO: 0.64 10*3/MM3 (ref 0.1–0.9)
MONOCYTES NFR BLD AUTO: 5.9 % (ref 5–12)
NEUTROPHILS NFR BLD AUTO: 68.3 % (ref 42.7–76)
NEUTROPHILS NFR BLD AUTO: 7.42 10*3/MM3 (ref 1.7–7)
NRBC BLD AUTO-RTO: 0 /100 WBC (ref 0–0.2)
PLATELET # BLD AUTO: 300 10*3/MM3 (ref 140–450)
PMV BLD AUTO: 11.2 FL (ref 6–12)
POTASSIUM SERPL-SCNC: 3.7 MMOL/L (ref 3.5–5.2)
PROT SERPL-MCNC: 7.2 G/DL (ref 6–8.5)
PROT UR STRIP-MCNC: ABNORMAL MG/DL
RBC # BLD AUTO: 4.37 10*6/MM3 (ref 3.77–5.28)
RH BLD: POSITIVE
SARS-COV-2 RDRP RESP QL NAA+PROBE: NOT DETECTED
SODIUM SERPL-SCNC: 133 MMOL/L (ref 136–145)
T&S EXPIRATION DATE: NORMAL
WBC # BLD AUTO: 10.86 10*3/MM3 (ref 3.4–10.8)

## 2020-08-27 PROCEDURE — C1755 CATHETER, INTRASPINAL: HCPCS

## 2020-08-27 PROCEDURE — 86850 RBC ANTIBODY SCREEN: CPT | Performed by: OBSTETRICS & GYNECOLOGY

## 2020-08-27 PROCEDURE — 80053 COMPREHEN METABOLIC PANEL: CPT | Performed by: OBSTETRICS & GYNECOLOGY

## 2020-08-27 PROCEDURE — 25010000002 PENICILLIN G POTASSIUM PER 600000 UNITS: Performed by: OBSTETRICS & GYNECOLOGY

## 2020-08-27 PROCEDURE — 85730 THROMBOPLASTIN TIME PARTIAL: CPT | Performed by: ANESTHESIOLOGY

## 2020-08-27 PROCEDURE — 81002 URINALYSIS NONAUTO W/O SCOPE: CPT | Performed by: OBSTETRICS & GYNECOLOGY

## 2020-08-27 PROCEDURE — 85025 COMPLETE CBC W/AUTO DIFF WBC: CPT | Performed by: OBSTETRICS & GYNECOLOGY

## 2020-08-27 PROCEDURE — 86901 BLOOD TYPING SEROLOGIC RH(D): CPT | Performed by: OBSTETRICS & GYNECOLOGY

## 2020-08-27 PROCEDURE — 87635 SARS-COV-2 COVID-19 AMP PRB: CPT | Performed by: OBSTETRICS & GYNECOLOGY

## 2020-08-27 PROCEDURE — C1755 CATHETER, INTRASPINAL: HCPCS | Performed by: ANESTHESIOLOGY

## 2020-08-27 PROCEDURE — 86900 BLOOD TYPING SEROLOGIC ABO: CPT | Performed by: OBSTETRICS & GYNECOLOGY

## 2020-08-27 RX ORDER — PENICILLIN G 3000000 [IU]/50ML
3 INJECTION, SOLUTION INTRAVENOUS EVERY 4 HOURS
Status: DISCONTINUED | OUTPATIENT
Start: 2020-08-27 | End: 2020-08-28 | Stop reason: HOSPADM

## 2020-08-27 RX ORDER — OXYTOCIN-SODIUM CHLORIDE 0.9% IV SOLN 30 UNIT/500ML 30-0.9/5 UT/ML-%
SOLUTION INTRAVENOUS
Status: DISPENSED
Start: 2020-08-27 | End: 2020-08-28

## 2020-08-27 RX ORDER — SODIUM CHLORIDE 0.9 % (FLUSH) 0.9 %
3 SYRINGE (ML) INJECTION EVERY 12 HOURS SCHEDULED
Status: DISCONTINUED | OUTPATIENT
Start: 2020-08-27 | End: 2020-08-28 | Stop reason: HOSPADM

## 2020-08-27 RX ORDER — LIDOCAINE HYDROCHLORIDE 10 MG/ML
5 INJECTION, SOLUTION EPIDURAL; INFILTRATION; INTRACAUDAL; PERINEURAL AS NEEDED
Status: DISCONTINUED | OUTPATIENT
Start: 2020-08-27 | End: 2020-08-28 | Stop reason: HOSPADM

## 2020-08-27 RX ORDER — FAMOTIDINE 10 MG/ML
20 INJECTION, SOLUTION INTRAVENOUS ONCE AS NEEDED
Status: DISCONTINUED | OUTPATIENT
Start: 2020-08-27 | End: 2020-08-28 | Stop reason: HOSPADM

## 2020-08-27 RX ORDER — HEPARIN SODIUM 5000 [USP'U]/ML
5000 INJECTION, SOLUTION INTRAVENOUS; SUBCUTANEOUS EVERY 12 HOURS SCHEDULED
COMMUNITY
End: 2020-08-30 | Stop reason: HOSPADM

## 2020-08-27 RX ORDER — SODIUM CHLORIDE 0.9 % (FLUSH) 0.9 %
10 SYRINGE (ML) INJECTION AS NEEDED
Status: DISCONTINUED | OUTPATIENT
Start: 2020-08-27 | End: 2020-08-28 | Stop reason: HOSPADM

## 2020-08-27 RX ORDER — OXYTOCIN-SODIUM CHLORIDE 0.9% IV SOLN 30 UNIT/500ML 30-0.9/5 UT/ML-%
2-30 SOLUTION INTRAVENOUS
Status: DISCONTINUED | OUTPATIENT
Start: 2020-08-27 | End: 2020-08-28

## 2020-08-27 RX ORDER — LIDOCAINE HYDROCHLORIDE AND EPINEPHRINE 15; 5 MG/ML; UG/ML
INJECTION, SOLUTION EPIDURAL AS NEEDED
Status: DISCONTINUED | OUTPATIENT
Start: 2020-08-27 | End: 2020-08-28 | Stop reason: SURG

## 2020-08-27 RX ORDER — ONDANSETRON 2 MG/ML
4 INJECTION INTRAMUSCULAR; INTRAVENOUS ONCE AS NEEDED
Status: DISCONTINUED | OUTPATIENT
Start: 2020-08-27 | End: 2020-08-28 | Stop reason: HOSPADM

## 2020-08-27 RX ORDER — DIPHENHYDRAMINE HYDROCHLORIDE 50 MG/ML
12.5 INJECTION INTRAMUSCULAR; INTRAVENOUS EVERY 8 HOURS PRN
Status: DISCONTINUED | OUTPATIENT
Start: 2020-08-27 | End: 2020-08-28 | Stop reason: HOSPADM

## 2020-08-27 RX ORDER — EPHEDRINE SULFATE 50 MG/ML
5 INJECTION, SOLUTION INTRAVENOUS AS NEEDED
Status: DISCONTINUED | OUTPATIENT
Start: 2020-08-27 | End: 2020-08-28 | Stop reason: HOSPADM

## 2020-08-27 RX ORDER — SODIUM CHLORIDE, SODIUM LACTATE, POTASSIUM CHLORIDE, CALCIUM CHLORIDE 600; 310; 30; 20 MG/100ML; MG/100ML; MG/100ML; MG/100ML
125 INJECTION, SOLUTION INTRAVENOUS CONTINUOUS
Status: DISCONTINUED | OUTPATIENT
Start: 2020-08-27 | End: 2020-08-28

## 2020-08-27 RX ORDER — MAGNESIUM CARB/ALUMINUM HYDROX 105-160MG
30 TABLET,CHEWABLE ORAL ONCE
Status: DISCONTINUED | OUTPATIENT
Start: 2020-08-27 | End: 2020-08-28 | Stop reason: HOSPADM

## 2020-08-27 RX ADMIN — OXYTOCIN 2 MILLI-UNITS/MIN: 10 INJECTION, SOLUTION INTRAMUSCULAR; INTRAVENOUS at 12:55

## 2020-08-27 RX ADMIN — LIDOCAINE HYDROCHLORIDE AND EPINEPHRINE 3 ML: 15; 5 INJECTION, SOLUTION EPIDURAL at 23:09

## 2020-08-27 RX ADMIN — SODIUM CHLORIDE 5 MILLION UNITS: 900 INJECTION INTRAVENOUS at 13:00

## 2020-08-27 RX ADMIN — SODIUM CHLORIDE, POTASSIUM CHLORIDE, SODIUM LACTATE AND CALCIUM CHLORIDE 999 ML/HR: 600; 310; 30; 20 INJECTION, SOLUTION INTRAVENOUS at 23:03

## 2020-08-27 RX ADMIN — FENTANYL CITRATE 11 ML/HR: 0.05 INJECTION, SOLUTION INTRAMUSCULAR; INTRAVENOUS at 23:14

## 2020-08-27 RX ADMIN — PENICILLIN G 3 MILLION UNITS: 3000000 INJECTION, SOLUTION INTRAVENOUS at 17:06

## 2020-08-27 RX ADMIN — SODIUM CHLORIDE, POTASSIUM CHLORIDE, SODIUM LACTATE AND CALCIUM CHLORIDE 125 ML/HR: 600; 310; 30; 20 INJECTION, SOLUTION INTRAVENOUS at 11:52

## 2020-08-27 RX ADMIN — PENICILLIN G 3 MILLION UNITS: 3000000 INJECTION, SOLUTION INTRAVENOUS at 20:48

## 2020-08-27 RX ADMIN — SODIUM CHLORIDE, POTASSIUM CHLORIDE, SODIUM LACTATE AND CALCIUM CHLORIDE 125 ML/HR: 600; 310; 30; 20 INJECTION, SOLUTION INTRAVENOUS at 19:32

## 2020-08-27 NOTE — ANESTHESIA PREPROCEDURE EVALUATION
Anesthesia Evaluation     Patient summary reviewed and Nursing notes reviewed                Airway   Mallampati: II  TM distance: >3 FB  Neck ROM: full  Dental - normal exam     Pulmonary - normal exam   (+) a smoker Former,   Cardiovascular - normal exam    (+) hypertension,       Neuro/Psych- negative ROS  GI/Hepatic/Renal/Endo - negative ROS     Musculoskeletal (-) negative ROS    Abdominal    Substance History - negative use     OB/GYN    (+) Pregnant,         Other                        Anesthesia Plan    ASA 2     epidural       Anesthetic plan, all risks, benefits, and alternatives have been provided, discussed and informed consent has been obtained with: patient.

## 2020-08-28 PROCEDURE — 25010000002 FENTANYL CITRATE (PF) 2500 MCG/50ML SOLUTION: Performed by: ANESTHESIOLOGY

## 2020-08-28 PROCEDURE — 25010000002 PENICILLIN G POTASSIUM PER 600000 UNITS: Performed by: OBSTETRICS & GYNECOLOGY

## 2020-08-28 PROCEDURE — 88307 TISSUE EXAM BY PATHOLOGIST: CPT

## 2020-08-28 PROCEDURE — 25010000002 ROPIVACAINE PER 1 MG: Performed by: ANESTHESIOLOGY

## 2020-08-28 PROCEDURE — 25010000002 ENOXAPARIN PER 10 MG: Performed by: OBSTETRICS & GYNECOLOGY

## 2020-08-28 RX ORDER — HYDROCORTISONE 25 MG/G
1 CREAM TOPICAL AS NEEDED
Status: DISCONTINUED | OUTPATIENT
Start: 2020-08-28 | End: 2020-08-30 | Stop reason: HOSPADM

## 2020-08-28 RX ORDER — HYDROXYZINE 50 MG/1
50 TABLET, FILM COATED ORAL NIGHTLY PRN
Status: DISCONTINUED | OUTPATIENT
Start: 2020-08-28 | End: 2020-08-30 | Stop reason: HOSPADM

## 2020-08-28 RX ORDER — ONDANSETRON 2 MG/ML
4 INJECTION INTRAMUSCULAR; INTRAVENOUS EVERY 6 HOURS PRN
Status: DISCONTINUED | OUTPATIENT
Start: 2020-08-28 | End: 2020-08-30 | Stop reason: HOSPADM

## 2020-08-28 RX ORDER — OXYCODONE AND ACETAMINOPHEN 10; 325 MG/1; MG/1
1 TABLET ORAL EVERY 4 HOURS PRN
Status: DISCONTINUED | OUTPATIENT
Start: 2020-08-28 | End: 2020-08-30 | Stop reason: HOSPADM

## 2020-08-28 RX ORDER — IBUPROFEN 800 MG/1
800 TABLET ORAL EVERY 8 HOURS PRN
Status: DISCONTINUED | OUTPATIENT
Start: 2020-08-28 | End: 2020-08-30 | Stop reason: HOSPADM

## 2020-08-28 RX ORDER — LANOLIN
CREAM (ML) TOPICAL
Status: DISCONTINUED | OUTPATIENT
Start: 2020-08-28 | End: 2020-08-30 | Stop reason: HOSPADM

## 2020-08-28 RX ORDER — MISOPROSTOL 200 UG/1
600 TABLET ORAL ONCE AS NEEDED
Status: DISCONTINUED | OUTPATIENT
Start: 2020-08-28 | End: 2020-08-30 | Stop reason: HOSPADM

## 2020-08-28 RX ORDER — ERYTHROMYCIN 5 MG/G
OINTMENT OPHTHALMIC
Status: DISPENSED
Start: 2020-08-28 | End: 2020-08-28

## 2020-08-28 RX ORDER — MISOPROSTOL 200 UG/1
800 TABLET ORAL AS NEEDED
Status: DISCONTINUED | OUTPATIENT
Start: 2020-08-28 | End: 2020-08-28 | Stop reason: HOSPADM

## 2020-08-28 RX ORDER — OXYTOCIN-SODIUM CHLORIDE 0.9% IV SOLN 30 UNIT/500ML 30-0.9/5 UT/ML-%
250 SOLUTION INTRAVENOUS CONTINUOUS
Status: ACTIVE | OUTPATIENT
Start: 2020-08-28 | End: 2020-08-28

## 2020-08-28 RX ORDER — CARBOPROST TROMETHAMINE 250 UG/ML
250 INJECTION, SOLUTION INTRAMUSCULAR AS NEEDED
Status: DISCONTINUED | OUTPATIENT
Start: 2020-08-28 | End: 2020-08-28 | Stop reason: HOSPADM

## 2020-08-28 RX ORDER — PRENATAL VIT/IRON FUM/FOLIC AC 27MG-0.8MG
1 TABLET ORAL DAILY
Status: DISCONTINUED | OUTPATIENT
Start: 2020-08-28 | End: 2020-08-30 | Stop reason: HOSPADM

## 2020-08-28 RX ORDER — ONDANSETRON 4 MG/1
4 TABLET, FILM COATED ORAL EVERY 8 HOURS PRN
Status: DISCONTINUED | OUTPATIENT
Start: 2020-08-28 | End: 2020-08-30 | Stop reason: HOSPADM

## 2020-08-28 RX ORDER — CALCIUM CARBONATE 200(500)MG
2 TABLET,CHEWABLE ORAL 3 TIMES DAILY PRN
Status: DISCONTINUED | OUTPATIENT
Start: 2020-08-28 | End: 2020-08-30 | Stop reason: HOSPADM

## 2020-08-28 RX ORDER — METHYLERGONOVINE MALEATE 0.2 MG/ML
200 INJECTION INTRAVENOUS ONCE AS NEEDED
Status: DISCONTINUED | OUTPATIENT
Start: 2020-08-28 | End: 2020-08-28 | Stop reason: HOSPADM

## 2020-08-28 RX ORDER — PHYTONADIONE 1 MG/.5ML
INJECTION, EMULSION INTRAMUSCULAR; INTRAVENOUS; SUBCUTANEOUS
Status: DISPENSED
Start: 2020-08-28 | End: 2020-08-28

## 2020-08-28 RX ORDER — OXYCODONE HYDROCHLORIDE AND ACETAMINOPHEN 5; 325 MG/1; MG/1
1 TABLET ORAL EVERY 4 HOURS PRN
Status: DISCONTINUED | OUTPATIENT
Start: 2020-08-28 | End: 2020-08-30 | Stop reason: HOSPADM

## 2020-08-28 RX ORDER — PROMETHAZINE HYDROCHLORIDE 25 MG/1
25 TABLET ORAL EVERY 6 HOURS PRN
Status: DISCONTINUED | OUTPATIENT
Start: 2020-08-28 | End: 2020-08-30 | Stop reason: HOSPADM

## 2020-08-28 RX ORDER — PROMETHAZINE HYDROCHLORIDE 12.5 MG/1
12.5 SUPPOSITORY RECTAL EVERY 6 HOURS PRN
Status: DISCONTINUED | OUTPATIENT
Start: 2020-08-28 | End: 2020-08-30 | Stop reason: HOSPADM

## 2020-08-28 RX ORDER — OXYTOCIN-SODIUM CHLORIDE 0.9% IV SOLN 30 UNIT/500ML 30-0.9/5 UT/ML-%
125 SOLUTION INTRAVENOUS CONTINUOUS PRN
Status: COMPLETED | OUTPATIENT
Start: 2020-08-28 | End: 2020-08-28

## 2020-08-28 RX ORDER — OXYTOCIN-SODIUM CHLORIDE 0.9% IV SOLN 30 UNIT/500ML 30-0.9/5 UT/ML-%
999 SOLUTION INTRAVENOUS ONCE
Status: COMPLETED | OUTPATIENT
Start: 2020-08-28 | End: 2020-08-28

## 2020-08-28 RX ORDER — LABETALOL HYDROCHLORIDE 5 MG/ML
20 INJECTION, SOLUTION INTRAVENOUS ONCE
Status: COMPLETED | OUTPATIENT
Start: 2020-08-28 | End: 2020-08-28

## 2020-08-28 RX ORDER — BISACODYL 10 MG
10 SUPPOSITORY, RECTAL RECTAL DAILY PRN
Status: DISCONTINUED | OUTPATIENT
Start: 2020-08-29 | End: 2020-08-30 | Stop reason: HOSPADM

## 2020-08-28 RX ORDER — DOCUSATE SODIUM 100 MG/1
100 CAPSULE, LIQUID FILLED ORAL 2 TIMES DAILY
Status: DISCONTINUED | OUTPATIENT
Start: 2020-08-28 | End: 2020-08-30 | Stop reason: HOSPADM

## 2020-08-28 RX ADMIN — SODIUM CHLORIDE, POTASSIUM CHLORIDE, SODIUM LACTATE AND CALCIUM CHLORIDE 125 ML/HR: 600; 310; 30; 20 INJECTION, SOLUTION INTRAVENOUS at 03:37

## 2020-08-28 RX ADMIN — IBUPROFEN 800 MG: 800 TABLET ORAL at 08:12

## 2020-08-28 RX ADMIN — ENOXAPARIN SODIUM 40 MG: 40 INJECTION SUBCUTANEOUS at 17:38

## 2020-08-28 RX ADMIN — IBUPROFEN 800 MG: 800 TABLET ORAL at 17:38

## 2020-08-28 RX ADMIN — OXYTOCIN 999 ML/HR: 10 INJECTION INTRAVENOUS at 05:27

## 2020-08-28 RX ADMIN — FENTANYL CITRATE 10 ML/HR: 0.05 INJECTION, SOLUTION INTRAMUSCULAR; INTRAVENOUS at 03:50

## 2020-08-28 RX ADMIN — PENICILLIN G 3 MILLION UNITS: 3000000 INJECTION, SOLUTION INTRAVENOUS at 01:01

## 2020-08-28 RX ADMIN — OXYTOCIN 125 ML/HR: 10 INJECTION INTRAVENOUS at 06:46

## 2020-08-28 RX ADMIN — NIFEDIPINE 90 MG: 60 TABLET, FILM COATED, EXTENDED RELEASE ORAL at 06:52

## 2020-08-28 RX ADMIN — PENICILLIN G 3 MILLION UNITS: 3000000 INJECTION, SOLUTION INTRAVENOUS at 04:48

## 2020-08-28 RX ADMIN — DOCUSATE SODIUM 100 MG: 100 CAPSULE, LIQUID FILLED ORAL at 21:12

## 2020-08-28 RX ADMIN — SODIUM CHLORIDE 999 ML: 9 INJECTION, SOLUTION INTRAVENOUS at 04:53

## 2020-08-28 RX ADMIN — LABETALOL HYDROCHLORIDE 20 MG: 5 INJECTION, SOLUTION INTRAVENOUS at 08:18

## 2020-08-28 NOTE — ANESTHESIA POSTPROCEDURE EVALUATION
"Patient: Salma Conklin    Procedure Summary     Date:  08/27/20 Room / Location:      Anesthesia Start:  2257 Anesthesia Stop:  08/28/20 0525    Procedure:  LABOR ANALGESIA Diagnosis:      Scheduled Providers:   Provider:  Abdiel Rosario MD    Anesthesia Type:  epidural ASA Status:  2          Anesthesia Type: epidural    Vitals  Vitals Value Taken Time   /83 8/28/2020  8:01 AM   Temp 37 °C (98.6 °F) 8/28/2020  5:30 AM   Pulse 73 8/28/2020  8:01 AM   Resp 18 8/28/2020  7:32 AM   SpO2     Vitals shown include unvalidated device data.        Post Anesthesia Care and Evaluation    Patient location during evaluation: bedside  Patient participation: complete - patient participated  Level of consciousness: awake and alert  Pain management: adequate  Airway patency: patent  Anesthetic complications: No anesthetic complications    Cardiovascular status: acceptable  Respiratory status: acceptable  Hydration status: acceptable    Comments: /91   Pulse 82   Temp 37 °C (98.6 °F) (Oral)   Resp 18   Ht 154.9 cm (61\")   Wt 90.2 kg (198 lb 12.8 oz)   Breastfeeding Yes   BMI 37.56 kg/m²       "

## 2020-08-28 NOTE — ANESTHESIA PROCEDURE NOTES
Labor Epidural      Patient location during procedure: OB  Performed By  Anesthesiologist: Abdiel Rosario MD  Preanesthetic Checklist  Completed: patient identified, site marked, surgical consent, pre-op evaluation, timeout performed, IV checked, risks and benefits discussed and monitors and equipment checked  Prep:  Pt Position:sitting  Sterile Tech:cap, gloves and mask  Prep:chlorhexidine gluconate and isopropyl alcohol  Monitoring:blood pressure monitoring, continuous pulse oximetry and EKG  Epidural Block Procedure:  Approach:midline  Guidance:landmark technique and palpation technique  Location:L4-L5  Needle Type:Tuohy  Needle Gauge:18 G  Loss of Resistance Medium: saline  Loss of Resistance: 9cm  Cath Depth at skin:15 cm  Paresthesia: none  Aspiration:negative  Test Dose:negative  Post Assessment:  Dressing:occlusive dressing applied and secured with tape  Pt Tolerance:patient tolerated the procedure well with no apparent complications  Complications:no

## 2020-08-29 LAB
BASOPHILS # BLD AUTO: 0.06 10*3/MM3 (ref 0–0.2)
BASOPHILS NFR BLD AUTO: 0.5 % (ref 0–1.5)
DEPRECATED RDW RBC AUTO: 42.2 FL (ref 37–54)
EOSINOPHIL # BLD AUTO: 0.27 10*3/MM3 (ref 0–0.4)
EOSINOPHIL NFR BLD AUTO: 2.1 % (ref 0.3–6.2)
ERYTHROCYTE [DISTWIDTH] IN BLOOD BY AUTOMATED COUNT: 14.1 % (ref 12.3–15.4)
HCT VFR BLD AUTO: 30 % (ref 34–46.6)
HGB BLD-MCNC: 10.4 G/DL (ref 12–15.9)
IMM GRANULOCYTES # BLD AUTO: 0.11 10*3/MM3 (ref 0–0.05)
IMM GRANULOCYTES NFR BLD AUTO: 0.9 % (ref 0–0.5)
LYMPHOCYTES # BLD AUTO: 2.94 10*3/MM3 (ref 0.7–3.1)
LYMPHOCYTES NFR BLD AUTO: 23.1 % (ref 19.6–45.3)
MCH RBC QN AUTO: 28.7 PG (ref 26.6–33)
MCHC RBC AUTO-ENTMCNC: 34.7 G/DL (ref 31.5–35.7)
MCV RBC AUTO: 82.9 FL (ref 79–97)
MONOCYTES # BLD AUTO: 0.72 10*3/MM3 (ref 0.1–0.9)
MONOCYTES NFR BLD AUTO: 5.7 % (ref 5–12)
NEUTROPHILS NFR BLD AUTO: 67.7 % (ref 42.7–76)
NEUTROPHILS NFR BLD AUTO: 8.62 10*3/MM3 (ref 1.7–7)
NRBC BLD AUTO-RTO: 0 /100 WBC (ref 0–0.2)
PLATELET # BLD AUTO: 266 10*3/MM3 (ref 140–450)
PMV BLD AUTO: 11.2 FL (ref 6–12)
RBC # BLD AUTO: 3.62 10*6/MM3 (ref 3.77–5.28)
WBC # BLD AUTO: 12.72 10*3/MM3 (ref 3.4–10.8)

## 2020-08-29 PROCEDURE — 85025 COMPLETE CBC W/AUTO DIFF WBC: CPT | Performed by: OBSTETRICS & GYNECOLOGY

## 2020-08-29 PROCEDURE — 25010000002 ENOXAPARIN PER 10 MG: Performed by: OBSTETRICS & GYNECOLOGY

## 2020-08-29 RX ORDER — IBUPROFEN 800 MG/1
800 TABLET ORAL EVERY 8 HOURS PRN
Qty: 30 TABLET | Refills: 0 | Status: SHIPPED | OUTPATIENT
Start: 2020-08-29

## 2020-08-29 RX ADMIN — NIFEDIPINE 90 MG: 60 TABLET, FILM COATED, EXTENDED RELEASE ORAL at 08:10

## 2020-08-29 RX ADMIN — IBUPROFEN 800 MG: 800 TABLET ORAL at 01:32

## 2020-08-29 RX ADMIN — IBUPROFEN 800 MG: 800 TABLET ORAL at 20:43

## 2020-08-29 RX ADMIN — DOCUSATE SODIUM 100 MG: 100 CAPSULE, LIQUID FILLED ORAL at 20:43

## 2020-08-29 RX ADMIN — Medication 1 TABLET: at 08:10

## 2020-08-29 RX ADMIN — ENOXAPARIN SODIUM 40 MG: 40 INJECTION SUBCUTANEOUS at 16:03

## 2020-08-29 RX ADMIN — DOCUSATE SODIUM 100 MG: 100 CAPSULE, LIQUID FILLED ORAL at 08:10

## 2020-08-30 VITALS
DIASTOLIC BLOOD PRESSURE: 82 MMHG | RESPIRATION RATE: 18 BRPM | BODY MASS INDEX: 37.53 KG/M2 | HEIGHT: 61 IN | HEART RATE: 74 BPM | TEMPERATURE: 98.1 F | WEIGHT: 198.8 LBS | SYSTOLIC BLOOD PRESSURE: 152 MMHG

## 2020-08-30 RX ADMIN — NIFEDIPINE 90 MG: 60 TABLET, FILM COATED, EXTENDED RELEASE ORAL at 09:18

## 2020-08-30 RX ADMIN — DOCUSATE SODIUM 100 MG: 100 CAPSULE, LIQUID FILLED ORAL at 09:18

## 2020-08-30 RX ADMIN — Medication 1 TABLET: at 09:18
